# Patient Record
Sex: FEMALE | NOT HISPANIC OR LATINO | Employment: OTHER | URBAN - METROPOLITAN AREA
[De-identification: names, ages, dates, MRNs, and addresses within clinical notes are randomized per-mention and may not be internally consistent; named-entity substitution may affect disease eponyms.]

---

## 2017-01-04 ENCOUNTER — APPOINTMENT (OUTPATIENT)
Dept: ONCOLOGY | Facility: HOSPITAL | Age: 42
End: 2017-01-04

## 2017-01-04 ENCOUNTER — APPOINTMENT (OUTPATIENT)
Dept: LAB | Facility: HOSPITAL | Age: 42
End: 2017-01-04

## 2017-01-10 ENCOUNTER — CLINICAL SUPPORT (OUTPATIENT)
Dept: ONCOLOGY | Facility: HOSPITAL | Age: 42
End: 2017-01-10

## 2017-01-10 ENCOUNTER — LAB (OUTPATIENT)
Dept: LAB | Facility: HOSPITAL | Age: 42
End: 2017-01-10

## 2017-01-10 DIAGNOSIS — D69.3 IMMUNE THROMBOCYTOPENIA (HCC): ICD-10-CM

## 2017-01-10 DIAGNOSIS — D50.8 OTHER IRON DEFICIENCY ANEMIA: ICD-10-CM

## 2017-01-10 LAB
BASOPHILS # BLD AUTO: 0.02 10*3/MM3 (ref 0–0.1)
BASOPHILS NFR BLD AUTO: 0.3 % (ref 0–1.1)
DEPRECATED RDW RBC AUTO: 41.1 FL (ref 37–49)
EOSINOPHIL # BLD AUTO: 0.19 10*3/MM3 (ref 0–0.36)
EOSINOPHIL NFR BLD AUTO: 3.2 % (ref 1–5)
ERYTHROCYTE [DISTWIDTH] IN BLOOD BY AUTOMATED COUNT: 11.8 % (ref 11.7–14.5)
FERRITIN SERPL-MCNC: 132.9 NG/ML (ref 11–207)
HCT VFR BLD AUTO: 41.4 % (ref 34–45)
HGB BLD-MCNC: 13.6 G/DL (ref 11.5–14.9)
IMM GRANULOCYTES # BLD: 0.01 10*3/MM3 (ref 0–0.03)
IMM GRANULOCYTES NFR BLD: 0.2 % (ref 0–0.5)
IRON 24H UR-MRATE: 70 MCG/DL (ref 37–145)
IRON SATN MFR SERPL: 28 % (ref 14–48)
LYMPHOCYTES # BLD AUTO: 1.26 10*3/MM3 (ref 1–3.5)
LYMPHOCYTES NFR BLD AUTO: 21 % (ref 20–49)
MCH RBC QN AUTO: 31.6 PG (ref 27–33)
MCHC RBC AUTO-ENTMCNC: 32.9 G/DL (ref 32–35)
MCV RBC AUTO: 96.1 FL (ref 83–97)
MONOCYTES # BLD AUTO: 0.65 10*3/MM3 (ref 0.25–0.8)
MONOCYTES NFR BLD AUTO: 10.8 % (ref 4–12)
NEUTROPHILS # BLD AUTO: 3.88 10*3/MM3 (ref 1.5–7)
NEUTROPHILS NFR BLD AUTO: 64.5 % (ref 39–75)
NRBC BLD MANUAL-RTO: 0 /100 WBC (ref 0–0)
PLATELET # BLD AUTO: 161 10*3/MM3 (ref 150–375)
PMV BLD AUTO: 11.6 FL (ref 8.9–12.1)
RBC # BLD AUTO: 4.31 10*6/MM3 (ref 3.9–5)
TIBC SERPL-MCNC: 253 MCG/DL (ref 249–505)
TRANSFERRIN SERPL-MCNC: 181 MG/DL (ref 200–360)
WBC NRBC COR # BLD: 6.01 10*3/MM3 (ref 4–10)

## 2017-01-10 PROCEDURE — 85025 COMPLETE CBC W/AUTO DIFF WBC: CPT | Performed by: INTERNAL MEDICINE

## 2017-01-10 PROCEDURE — 82728 ASSAY OF FERRITIN: CPT | Performed by: INTERNAL MEDICINE

## 2017-01-10 PROCEDURE — 84466 ASSAY OF TRANSFERRIN: CPT | Performed by: INTERNAL MEDICINE

## 2017-01-10 PROCEDURE — 36415 COLL VENOUS BLD VENIPUNCTURE: CPT | Performed by: INTERNAL MEDICINE

## 2017-01-10 PROCEDURE — 83540 ASSAY OF IRON: CPT | Performed by: INTERNAL MEDICINE

## 2017-01-10 NOTE — PROGRESS NOTES
CBC reviewed with pt. Copy of labs given to pt. All counts WNL. Only c/o is that pt currently has a terrible head cold for which she is treating with OTC meds. Pt is due to see Dr. Alvarez in may, pt will call at a later time to make an appt. Pt will call tomorrow for iron results.

## 2017-07-24 ENCOUNTER — OFFICE VISIT (OUTPATIENT)
Dept: ONCOLOGY | Facility: CLINIC | Age: 42
End: 2017-07-24

## 2017-07-24 ENCOUNTER — LAB (OUTPATIENT)
Dept: LAB | Facility: HOSPITAL | Age: 42
End: 2017-07-24

## 2017-07-24 VITALS
HEART RATE: 71 BPM | BODY MASS INDEX: 20.84 KG/M2 | WEIGHT: 132.8 LBS | OXYGEN SATURATION: 100 % | RESPIRATION RATE: 14 BRPM | TEMPERATURE: 98.3 F | HEIGHT: 67 IN | DIASTOLIC BLOOD PRESSURE: 78 MMHG | SYSTOLIC BLOOD PRESSURE: 108 MMHG

## 2017-07-24 DIAGNOSIS — D50.8 OTHER IRON DEFICIENCY ANEMIA: Primary | ICD-10-CM

## 2017-07-24 DIAGNOSIS — D69.3 IMMUNE THROMBOCYTOPENIA (HCC): ICD-10-CM

## 2017-07-24 LAB
BASOPHILS # BLD AUTO: 0.03 10*3/MM3 (ref 0–0.1)
BASOPHILS NFR BLD AUTO: 0.4 % (ref 0–1.1)
DEPRECATED RDW RBC AUTO: 41 FL (ref 37–49)
EOSINOPHIL # BLD AUTO: 0.31 10*3/MM3 (ref 0–0.36)
EOSINOPHIL NFR BLD AUTO: 4.1 % (ref 1–5)
ERYTHROCYTE [DISTWIDTH] IN BLOOD BY AUTOMATED COUNT: 11.5 % (ref 11.7–14.5)
FERRITIN SERPL-MCNC: 118.7 NG/ML (ref 11–207)
HCT VFR BLD AUTO: 43.9 % (ref 34–45)
HGB BLD-MCNC: 14.6 G/DL (ref 11.5–14.9)
HOLD SPECIMEN: NORMAL
IMM GRANULOCYTES # BLD: 0.01 10*3/MM3 (ref 0–0.03)
IMM GRANULOCYTES NFR BLD: 0.1 % (ref 0–0.5)
IRON 24H UR-MRATE: 61 MCG/DL (ref 37–145)
IRON SATN MFR SERPL: 23 % (ref 14–48)
LYMPHOCYTES # BLD AUTO: 1.91 10*3/MM3 (ref 1–3.5)
LYMPHOCYTES NFR BLD AUTO: 25 % (ref 20–49)
MCH RBC QN AUTO: 32.2 PG (ref 27–33)
MCHC RBC AUTO-ENTMCNC: 33.3 G/DL (ref 32–35)
MCV RBC AUTO: 96.7 FL (ref 83–97)
MONOCYTES # BLD AUTO: 0.56 10*3/MM3 (ref 0.25–0.8)
MONOCYTES NFR BLD AUTO: 7.3 % (ref 4–12)
NEUTROPHILS # BLD AUTO: 4.83 10*3/MM3 (ref 1.5–7)
NEUTROPHILS NFR BLD AUTO: 63.1 % (ref 39–75)
NRBC BLD MANUAL-RTO: 0 /100 WBC (ref 0–0)
PLATELET # BLD AUTO: 162 10*3/MM3 (ref 150–375)
PMV BLD AUTO: 12 FL (ref 8.9–12.1)
RBC # BLD AUTO: 4.54 10*6/MM3 (ref 3.9–5)
TIBC SERPL-MCNC: 269 MCG/DL (ref 249–505)
TRANSFERRIN SERPL-MCNC: 192 MG/DL (ref 200–360)
WBC NRBC COR # BLD: 7.65 10*3/MM3 (ref 4–10)

## 2017-07-24 PROCEDURE — 82728 ASSAY OF FERRITIN: CPT

## 2017-07-24 PROCEDURE — 84466 ASSAY OF TRANSFERRIN: CPT

## 2017-07-24 PROCEDURE — 83540 ASSAY OF IRON: CPT

## 2017-07-24 PROCEDURE — 85025 COMPLETE CBC W/AUTO DIFF WBC: CPT

## 2017-07-24 PROCEDURE — 36415 COLL VENOUS BLD VENIPUNCTURE: CPT

## 2017-07-24 PROCEDURE — 99213 OFFICE O/P EST LOW 20 MIN: CPT | Performed by: INTERNAL MEDICINE

## 2017-07-24 RX ORDER — DIPHENHYDRAMINE HCL 25 MG
25 TABLET ORAL EVERY 6 HOURS PRN
COMMUNITY
End: 2017-09-20

## 2017-07-24 NOTE — PROGRESS NOTES
Subjective     CHIEF COMPLAINT:    1. Iron deficiency anemia.   2. History of Thrombocytopenia.     HISTORY OF PRESENT ILLNESS:     Terri Chaudhary is a 41 y.o.   patient with the above medical history.  She was previously on Otezla  for her psoriasis but she was taken off of the medicine 3 months ago  due to side effects (abdominal pain and dizziness).       The patient reports episodes of feeling dizzy especially with walking.  She was told that this may present vertigo.  She has occasional frontal headaches.  No nausea or vomiting.        Past Medical History:   Diagnosis Date   • Endometriosis    • H/O food allergy     allergic to all fruits and vegetables   • History of heavy periods    • History of seasonal allergies    • History of transfusion of packed red blood cells 2012   • Iron deficiency anemia    • Psoriasis    • Thrombocytopenia    • Traumatic rupture of bladder     during hysterectomy       Past Surgical History:   Procedure Laterality Date   •  SECTION      x4   • HYSTERECTOMY  2014   • PELVIC LAPAROSCOPY         Cancer-related family history is negative for Cancer.    SCHEDULED MEDS:       Current Outpatient Prescriptions:   •  Cetirizine HCl 10 MG capsule, Take  by mouth Daily., Disp: , Rfl:   •  diphenhydrAMINE (BENADRYL) 25 MG tablet, Take 25 mg by mouth Every 6 (Six) Hours As Needed., Disp: , Rfl:     REVIEW OF SYSTEMS:  GENERAL:  No fever or chills. No weight change.  Some fatigue.    SKIN:  Psoriasis.  HEME/LYMPH: No anemia, easy bruisability or enlarged lymph nodes.  EYES:  No vision changes or diplopia.  ENT: Sinus headaches.  She has fluid in her ears.  RESPIRATORY:  No cough, shortness of breath, hemoptysis or wheezing.  CVS:  No chest pain, palpitations or lower extremity swelling.  GI:  No abdominal pain, nausea, vomiting, constipation, diarrhea, melena or hematochezia.  MUSCULOSKELETAL:  Some arthritis in the small joints of the hands and stiffness in the  "morning.    Objective   VITAL SIGNS:     Vitals:    07/24/17 1406   BP: 108/78   Pulse: 71   Resp: 14   Temp: 98.3 °F (36.8 °C)   TempSrc: Oral   SpO2: 100%   Weight: 132 lb 12.8 oz (60.2 kg)   Height: 66.54\" (169 cm)  Comment: new ht   PainSc: 0-No pain       Wt Readings from Last 3 Encounters:   07/24/17 132 lb 12.8 oz (60.2 kg)   05/25/16 138 lb 6.4 oz (62.8 kg)       PHYSICAL EXAMINATION:  GENERAL:  The patient appears in good general condition, not in acute distress.  SKIN:  No ecchymosis or petechiae.  EYES: No jaundice or pallor    .  RESULT REVIEW:     Results from last 7 days  Lab Units 07/24/17  1359   WBC 10*3/mm3 7.65   HEMOGLOBIN g/dL 14.6   HEMATOCRIT % 43.9   PLATELETS 10*3/mm3 162       Results from last 7 days  Lab Units 07/24/17  1359   FERRITIN ng/mL 118.70   IRON mcg/dL 61   TIBC mcg/dL 269       Assessment/Plan    1.  Recurrent iron deficiency anemia. She has history of heavy blood losses from her periods but she had a hysterectomy in July 2014.  She had GI workup that revealed no evidence of a source of blood loss.  The patient is most likely not absorbing iron that she obtains from vegetables.  She is avoiding red meats due to the potential association with worsening of the psoriasis.  She was symptomatic when she was seen in December 2015 and she was treated IV Feraheme x2 doses.  Her hemoglobin iron stores remain normal.  She is having symptoms of fatigue and episodes of dizziness/vertigo.  I reassured the patient that her symptoms are not related to her iron deficiency anemia since her hemoglobin is normal and her iron Stores are adequate.    2.   History of thrombocytopenia, likely autoimmune. Platelet count is normal today.    Since her hemoglobin and iron stores are adequate, I recommended a repeat CBC, ferritin and iron panel in 6 months with RN review.  We will see in follow-up in one year with CBC, ferritin and iron panel.  She will notify us sooner if she has recurrence of the " symptoms she had from the iron deficiency anemia.      Bea Alvarez MD  07/24/17

## 2017-09-20 ENCOUNTER — OFFICE VISIT (OUTPATIENT)
Dept: OBSTETRICS AND GYNECOLOGY | Facility: CLINIC | Age: 42
End: 2017-09-20

## 2017-09-20 VITALS
DIASTOLIC BLOOD PRESSURE: 91 MMHG | WEIGHT: 136 LBS | SYSTOLIC BLOOD PRESSURE: 140 MMHG | HEIGHT: 66 IN | HEART RATE: 96 BPM | BODY MASS INDEX: 21.86 KG/M2

## 2017-09-20 DIAGNOSIS — Z01.419 ENCOUNTER FOR GYNECOLOGICAL EXAMINATION WITHOUT ABNORMAL FINDING: Primary | ICD-10-CM

## 2017-09-20 PROCEDURE — 99395 PREV VISIT EST AGE 18-39: CPT | Performed by: OBSTETRICS & GYNECOLOGY

## 2017-09-20 NOTE — PROGRESS NOTES
GYN Annual Exam     CC- Here for annual exam.     Terri Chaudhary is a 41 y.o. female who presents for annual well woman exam. Periods are absent due to Hysterectomy. Pt c/o menstrual cramps monthly when she should be having a period. Pt feels swollen during this time.     OB History      Para Term  AB Living    4 4    4    SAB TAB Ectopic Multiple Live Births                  Current contraception: status post hysterectomy  History of abnormal Pap smear: Yes, no treatment required  Family history of uterine, colon or ovarian cancer: no  History of abnormal mammogram: no  Family history of breast cancer: no  Last Pap : 2015 NL HPV-    Past Medical History:   Diagnosis Date   • Endometriosis    • H/O food allergy     allergic to all fruits and vegetables   • History of heavy periods    • History of seasonal allergies    • History of transfusion of packed red blood cells 2012   • Iron deficiency anemia    • Psoriasis    • Thrombocytopenia    • Traumatic rupture of bladder     during hysterectomy       Past Surgical History:   Procedure Laterality Date   •  SECTION      x4   • HYSTERECTOMY  2014   • PELVIC LAPAROSCOPY           Current Outpatient Prescriptions:   •  Apremilast (OTEZLA) 30 MG tablet, Take  by mouth., Disp: , Rfl:     No Known Allergies    Social History   Substance Use Topics   • Smoking status: Never Smoker   • Smokeless tobacco: Never Used   • Alcohol use No       Family History   Problem Relation Age of Onset   • Aneurysm Mother    • Diabetes Father      type 2   • Deep vein thrombosis Father    • No Known Problems Sister    • No Known Problems Brother    • No Known Problems Daughter    • No Known Problems Son    • No Known Problems Maternal Aunt    • No Known Problems Maternal Uncle    • No Known Problems Paternal Aunt    • No Known Problems Paternal Uncle    • No Known Problems Maternal Grandmother    • No Known Problems Maternal Grandfather    • No Known Problems  "Paternal Grandmother    • No Known Problems Paternal Grandfather    • Cancer Neg Hx    • Breast cancer Neg Hx    • Ovarian cancer Neg Hx    • Uterine cancer Neg Hx    • Colon cancer Neg Hx    • Pulmonary embolism Neg Hx        Review of Systems   Constitutional: Negative for chills and fever.   Gastrointestinal: Negative for abdominal pain.   Genitourinary: Negative for dysuria, pelvic pain, vaginal bleeding and vaginal discharge.   All other systems reviewed and are negative.      /91  Pulse 96  Ht 66\" (167.6 cm)  Wt 136 lb (61.7 kg)  Breastfeeding? No  BMI 21.95 kg/m2    Physical Exam   Constitutional: She is oriented to person, place, and time. She appears well-developed and well-nourished. No distress.   HENT:   Head: Normocephalic and atraumatic.   Eyes: Conjunctivae are normal.   Neck: Normal range of motion. Neck supple. No thyromegaly present.   Cardiovascular: Normal rate and regular rhythm.    No murmur heard.  Pulmonary/Chest: Effort normal and breath sounds normal. Right breast exhibits no inverted nipple, no mass and no nipple discharge. Left breast exhibits no inverted nipple, no mass and no nipple discharge.   Abdominal: Soft. Bowel sounds are normal. She exhibits no distension. There is no tenderness.   Genitourinary: Vagina normal and uterus normal. Pelvic exam was performed with patient supine. There is no lesion on the right labia. There is no lesion on the left labia. Right adnexum displays no mass and no tenderness. Left adnexum displays no mass and no tenderness. No bleeding (cervix and uterus absent ) in the vagina. No vaginal discharge found.   Musculoskeletal: She exhibits no edema.   Lymphadenopathy:        Right: No inguinal adenopathy present.        Left: No inguinal adenopathy present.   Neurological: She is alert and oriented to person, place, and time.   Skin: No rash noted.   Psychiatric: She has a normal mood and affect. Her behavior is normal.          Assessment     1) " GYN annual well woman exam.        Plan     1) Breast Health - Clinical breast exam & mammogram yearly, Self breast awareness monthly  2) Pap - up to date  3) Smoking status- non-smoker   4) Seat belts & Sunscreen recommended  5) Follow up prn and one year.     Maurice Florez MD   9/20/2017  1:38 PM

## 2017-10-16 ENCOUNTER — TELEPHONE (OUTPATIENT)
Dept: GASTROENTEROLOGY | Facility: CLINIC | Age: 42
End: 2017-10-16

## 2017-10-16 NOTE — TELEPHONE ENCOUNTER
Call to [facility] @ [phone] and spoke with Candy.  Request ER records from 9/26 and 10/4 be faxed to [phone].

## 2017-10-16 NOTE — TELEPHONE ENCOUNTER
----- Message from Taniya Esparza sent at 10/12/2017  9:20 AM EDT -----  Regarding: RECORDS  PT HAS O/V 10/19 WITH DR MCLAIN. SHE WAS SEEN @ [facility] ER 09/26 & 10/04. HAD TEST DONE. PLEASE REQUEST. THANKS

## 2017-10-19 ENCOUNTER — OFFICE VISIT (OUTPATIENT)
Dept: GASTROENTEROLOGY | Facility: CLINIC | Age: 42
End: 2017-10-19

## 2017-10-19 VITALS
HEIGHT: 66 IN | WEIGHT: 138.6 LBS | SYSTOLIC BLOOD PRESSURE: 118 MMHG | DIASTOLIC BLOOD PRESSURE: 76 MMHG | BODY MASS INDEX: 22.28 KG/M2 | TEMPERATURE: 98.6 F

## 2017-10-19 DIAGNOSIS — R10.13 EPIGASTRIC PAIN: Primary | ICD-10-CM

## 2017-10-19 PROCEDURE — 99213 OFFICE O/P EST LOW 20 MIN: CPT | Performed by: INTERNAL MEDICINE

## 2017-10-19 RX ORDER — OMEPRAZOLE 40 MG/1
40 CAPSULE, DELAYED RELEASE ORAL DAILY
Qty: 30 CAPSULE | Refills: 11 | Status: SHIPPED | OUTPATIENT
Start: 2017-10-19 | End: 2017-11-08

## 2017-10-19 RX ORDER — DIPHENHYDRAMINE HCL 25 MG
25 CAPSULE ORAL EVERY 6 HOURS PRN
COMMUNITY
End: 2017-11-08

## 2017-10-19 NOTE — PROGRESS NOTES
Chief Complaint   Patient presents with   • Abdominal Pain   • Nausea       History of Present Illness: 41 yo female who in  developed RUQ abdom pain and went to the [facility] ER. Labs and CT abd were done. She then developed epigastric pain (with radiation to the mid back) and went back to ER. It is worse when she movees certain ways and when she eats. Nothing makes the epigastric pain better.  She was found to have an enlarged liver and was taken off of most of her meds. She saw General Surgeon (Dr. Atkinson) who found an umbilical hernia. She saw an Immunologist (Dr. Carmona) and he destin labs. She is allergic to lots of foods: all fruits and vegies (she will get hives in moutth and throat swells, chest pain). She takes benadryl daily. She can eat cooked veggies. 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 She takes Aleve prn. Some nausea, no vomiting. No heartburn. No dysphagia or odynaphagia. She is usually constipated. Yesterday she had diarrhea. No appetite. She has gained weight. Out of work. NO rectal bleeding or melena. 4 kids, . Nonsmoker, no ETOH. Little caffeine. LMP - s/p hysterectomy. .  We reviewed the labs and the US of the gallbladder and CT abd/pelvis done in the last 2 mos. We reviewed her  EGD and colonoscopy.    Past Medical History:   Diagnosis Date   • Endometriosis    • H/O food allergy     allergic to all fruits and vegetables   • History of heavy periods    • History of seasonal allergies    • History of transfusion of packed red blood cells 2012   • Iron deficiency anemia    • Psoriasis    • Thrombocytopenia    • Traumatic rupture of bladder     during hysterectomy       Past Surgical History:   Procedure Laterality Date   •  SECTION      x4   • COLONOSCOPY  2016    NBET.  no bx    • ENDOSCOPY  2016    Erythematous mucosa in the stomach.  PATH: Gastric mucosa with patchy minimal chronic gastritis   • HYSTERECTOMY  2014   • PELVIC  LAPAROSCOPY           Current Outpatient Prescriptions:   •  Apremilast (OTEZLA) 30 MG tablet, Take  by mouth., Disp: , Rfl:   •  diphenhydrAMINE (BENADRYL) 25 mg capsule, Take 25 mg by mouth Every 6 (Six) Hours As Needed for Itching., Disp: , Rfl:   •  omeprazole (priLOSEC) 40 MG capsule, Take 1 capsule by mouth Daily., Disp: 30 capsule, Rfl: 11    No Known Allergies    Family History   Problem Relation Age of Onset   • Aneurysm Mother    • Diabetes Father      type 2   • Deep vein thrombosis Father    • No Known Problems Sister    • No Known Problems Brother    • No Known Problems Daughter    • No Known Problems Son    • No Known Problems Maternal Aunt    • No Known Problems Maternal Uncle    • No Known Problems Paternal Aunt    • No Known Problems Paternal Uncle    • No Known Problems Maternal Grandmother    • No Known Problems Maternal Grandfather    • No Known Problems Paternal Grandmother    • No Known Problems Paternal Grandfather    • Cancer Neg Hx    • Breast cancer Neg Hx    • Ovarian cancer Neg Hx    • Uterine cancer Neg Hx    • Colon cancer Neg Hx    • Pulmonary embolism Neg Hx        Social History     Social History   • Marital status:      Spouse name: Elio   • Number of children: N/A   • Years of education: N/A     Occupational History   •  Self-Employed     Social History Main Topics   • Smoking status: Never Smoker   • Smokeless tobacco: Never Used   • Alcohol use No   • Drug use: No   • Sexual activity: Yes     Partners: Male     Birth control/ protection: Surgical     Other Topics Concern   • Not on file     Social History Narrative       Review of Systems   Gastrointestinal: Positive for abdominal distention and abdominal pain.   All other systems reviewed and are negative.      Vitals:    10/19/17 0901   BP: 118/76   Temp: 98.6 °F (37 °C)       Physical Exam   Constitutional: She is oriented to person, place, and time. She appears well-developed and well-nourished. No distress.    HENT:   Head: Normocephalic and atraumatic. Hair is normal.   Right Ear: Hearing, tympanic membrane, external ear and ear canal normal. No drainage. No decreased hearing is noted.   Left Ear: Hearing, tympanic membrane, external ear and ear canal normal. No decreased hearing is noted.   Nose: No nasal deformity.   Mouth/Throat: Oropharynx is clear and moist.   Eyes: Conjunctivae, EOM and lids are normal. Pupils are equal, round, and reactive to light. Right eye exhibits no discharge. Left eye exhibits no discharge.   Neck: Normal range of motion. Neck supple. No JVD present. No tracheal deviation present. No thyromegaly present.   Cardiovascular: Normal rate, regular rhythm, normal heart sounds, intact distal pulses and normal pulses.  Exam reveals no gallop and no friction rub.    No murmur heard.  Pulmonary/Chest: Effort normal and breath sounds normal. No respiratory distress. She has no wheezes. She has no rales. She exhibits no tenderness.   Abdominal: Soft. Bowel sounds are normal. She exhibits no distension and no mass. There is tenderness. There is no rebound and no guarding. No hernia.   Miild epigastric tenderness.   Genitourinary: Rectal exam shows guaiac negative stool.   Genitourinary Comments: Normal anorectal exam.   Musculoskeletal: Normal range of motion. She exhibits no edema, tenderness or deformity.   Lymphadenopathy:     She has no cervical adenopathy.   Neurological: She is alert and oriented to person, place, and time. She has normal reflexes. She displays normal reflexes. No cranial nerve deficit. She exhibits normal muscle tone. Coordination normal.   Skin: Skin is warm and dry. No rash noted. She is not diaphoretic. No erythema.   Psychiatric: She has a normal mood and affect. Her behavior is normal. Judgment and thought content normal.   Vitals reviewed.      Terri was seen today for abdominal pain and nausea.    Diagnoses and all orders for this visit:    Epigastric pain  -     Case  Request; Standing  -     Case Request  -     omeprazole (priLOSEC) 40 MG capsule; Take 1 capsule by mouth Daily.    Other orders  -     Implement Anesthesia orders day of procedure.; Standing  -     Obtain informed consent; Standing       Assessment:  1) Epigastric pain  2) Constipation    Recommendations:  1) EGD in the next two weeks. If this is negative then consider a Kinevac Hida.  2) Omeprazole 40 mg/day.      No Follow-up on file.    Mohit Sawyer MD  10/19/2017

## 2017-10-26 ENCOUNTER — TELEPHONE (OUTPATIENT)
Dept: ONCOLOGY | Facility: HOSPITAL | Age: 42
End: 2017-10-26

## 2017-10-26 NOTE — TELEPHONE ENCOUNTER
Notified patient and message sent to scheduling. Pt v/u    ----- Message from Bea Alvarez MD sent at 10/26/2017  1:33 PM EDT -----  Ok to schedule the patient for an MD visit in 1-2 weeks + CBC STAT Ferritin iron panel  Thank you    ----- Message -----     From: Kaley Verde RN     Sent: 10/26/2017  12:40 PM       To: Bea Alvarez MD    Patient calling to see if she can schedule MD appt. Pt states she had labs drawn with another MD and he said they were ok but she wants to review them with us. Pt has had some issues with abdominal/back pain. They have done a lot of scans which did not show anything except slightly enlarged liver. She has an Upper GI scheduled for Nov 6th. At this point they thought the pain was shingles without the rash and have not determined anything else. I believe patient's labs and test are in care everywhere if you would like to review them. Let me know if you think patient needs to be seen soon and if so any labs to be ordered? Thanks

## 2017-10-26 NOTE — TELEPHONE ENCOUNTER
Patient calling to see if she can change upcoming lab appt to an MD appt instead. Pt states she had labs drawn with another MD and he said they were ok but she wants to review them with us. Pt has had some issues with abdominal/back pain. They have done a lot of scans which did not show anything except slightly enlarged liver. She has an Upper GI scheduled for Nov 6th. At this point they thought the pain was shingles without the rash and have not determined anything else.   Requested patient to have her labs sent to our office. I believe they are already in care everywhere along with scans.   Inbasket sent to Dr Alvarez to see if patient needs f/u with us sooner. Will call patient back once we hear from him.

## 2017-11-06 ENCOUNTER — HOSPITAL ENCOUNTER (OUTPATIENT)
Facility: HOSPITAL | Age: 42
Setting detail: HOSPITAL OUTPATIENT SURGERY
Discharge: HOME | End: 2017-11-06
Attending: INTERNAL MEDICINE | Admitting: INTERNAL MEDICINE

## 2017-11-06 ENCOUNTER — ANESTHESIA (OUTPATIENT)
Dept: GASTROENTEROLOGY | Facility: HOSPITAL | Age: 42
End: 2017-11-06

## 2017-11-06 ENCOUNTER — ANESTHESIA EVENT (OUTPATIENT)
Dept: GASTROENTEROLOGY | Facility: HOSPITAL | Age: 42
End: 2017-11-06

## 2017-11-06 VITALS
HEIGHT: 66 IN | RESPIRATION RATE: 18 BRPM | WEIGHT: 140.2 LBS | SYSTOLIC BLOOD PRESSURE: 117 MMHG | TEMPERATURE: 97.7 F | BODY MASS INDEX: 22.53 KG/M2 | HEART RATE: 67 BPM | OXYGEN SATURATION: 100 % | DIASTOLIC BLOOD PRESSURE: 81 MMHG

## 2017-11-06 DIAGNOSIS — R10.10 PAIN OF UPPER ABDOMEN: Primary | ICD-10-CM

## 2017-11-06 DIAGNOSIS — R10.13 EPIGASTRIC PAIN: ICD-10-CM

## 2017-11-06 PROCEDURE — 87081 CULTURE SCREEN ONLY: CPT | Performed by: INTERNAL MEDICINE

## 2017-11-06 PROCEDURE — 88312 SPECIAL STAINS GROUP 1: CPT | Performed by: INTERNAL MEDICINE

## 2017-11-06 PROCEDURE — 43239 EGD BIOPSY SINGLE/MULTIPLE: CPT | Performed by: INTERNAL MEDICINE

## 2017-11-06 PROCEDURE — 88305 TISSUE EXAM BY PATHOLOGIST: CPT | Performed by: INTERNAL MEDICINE

## 2017-11-06 PROCEDURE — 25010000002 PROPOFOL 10 MG/ML EMULSION: Performed by: ANESTHESIOLOGY

## 2017-11-06 RX ORDER — SODIUM CHLORIDE 0.9 % (FLUSH) 0.9 %
1-10 SYRINGE (ML) INJECTION AS NEEDED
Status: DISCONTINUED | OUTPATIENT
Start: 2017-11-06 | End: 2017-11-06 | Stop reason: HOSPADM

## 2017-11-06 RX ORDER — FEXOFENADINE HCL 180 MG/1
180 TABLET ORAL DAILY
Status: ON HOLD | COMMUNITY
End: 2017-11-27

## 2017-11-06 RX ORDER — PROPOFOL 10 MG/ML
VIAL (ML) INTRAVENOUS AS NEEDED
Status: DISCONTINUED | OUTPATIENT
Start: 2017-11-06 | End: 2017-11-06 | Stop reason: SURG

## 2017-11-06 RX ORDER — SODIUM CHLORIDE, SODIUM LACTATE, POTASSIUM CHLORIDE, CALCIUM CHLORIDE 600; 310; 30; 20 MG/100ML; MG/100ML; MG/100ML; MG/100ML
30 INJECTION, SOLUTION INTRAVENOUS CONTINUOUS PRN
Status: DISCONTINUED | OUTPATIENT
Start: 2017-11-06 | End: 2017-11-06 | Stop reason: HOSPADM

## 2017-11-06 RX ORDER — ONDANSETRON 4 MG/1
4 TABLET, FILM COATED ORAL EVERY 8 HOURS PRN
COMMUNITY

## 2017-11-06 RX ORDER — LIDOCAINE HYDROCHLORIDE 20 MG/ML
INJECTION, SOLUTION INFILTRATION; PERINEURAL AS NEEDED
Status: DISCONTINUED | OUTPATIENT
Start: 2017-11-06 | End: 2017-11-06 | Stop reason: SURG

## 2017-11-06 RX ADMIN — PROPOFOL 200 MG: 10 INJECTION, EMULSION INTRAVENOUS at 10:52

## 2017-11-06 RX ADMIN — PROPOFOL 200 MG: 10 INJECTION, EMULSION INTRAVENOUS at 10:42

## 2017-11-06 RX ADMIN — SODIUM CHLORIDE, POTASSIUM CHLORIDE, SODIUM LACTATE AND CALCIUM CHLORIDE 30 ML/HR: 600; 310; 30; 20 INJECTION, SOLUTION INTRAVENOUS at 09:59

## 2017-11-06 RX ADMIN — LIDOCAINE HYDROCHLORIDE 100 MG: 20 INJECTION, SOLUTION INFILTRATION; PERINEURAL at 10:44

## 2017-11-06 NOTE — H&P
Chief Complaint   Patient presents with   • Abdominal Pain   • Nausea         History of Present Illness: 43 yo female who in  developed RUQ abdom pain and went to the [facility] ER. Labs and CT abd were done. She then developed epigastric pain (with radiation to the mid back) and went back to ER. It is worse when she movees certain ways and when she eats. Nothing makes the epigastric pain better.  She was found to have an enlarged liver and was taken off of most of her meds. She saw General Surgeon (Dr. Atkinson) who found an umbilical hernia. She saw an Immunologist (Dr. Carmona) and he destin labs. She is allergic to lots of foods: all fruits and vegies (she will get hives in moutth and throat swells, chest pain). She takes benadryl daily. She can eat cooked veggies. 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 She takes Aleve prn. Some nausea, no vomiting. No heartburn. No dysphagia or odynaphagia. She is usually constipated. Yesterday she had diarrhea. No appetite. She has gained weight. Out of work. NO rectal bleeding or melena. 4 kids, . Nonsmoker, no ETOH. Little caffeine. LMP - s/p hysterectomy. .  We reviewed the labs and the US of the gallbladder and CT abd/pelvis done in the last 2 mos. We reviewed her  EGD and colonoscopy.      Medical History         Past Medical History:   Diagnosis Date   • Endometriosis     • H/O food allergy       allergic to all fruits and vegetables   • History of heavy periods     • History of seasonal allergies     • History of transfusion of packed red blood cells 2012   • Iron deficiency anemia     • Psoriasis     • Thrombocytopenia     • Traumatic rupture of bladder       during hysterectomy             Surgical History          Past Surgical History:   Procedure Laterality Date   •  SECTION         x4   • COLONOSCOPY   2016     NBET.  no bx    • ENDOSCOPY   2016     Erythematous mucosa in the stomach.  PATH: Gastric mucosa with  patchy minimal chronic gastritis   • HYSTERECTOMY   07/2014   • PELVIC LAPAROSCOPY                   Current Outpatient Prescriptions:   •  Apremilast (OTEZLA) 30 MG tablet, Take  by mouth., Disp: , Rfl:   •  diphenhydrAMINE (BENADRYL) 25 mg capsule, Take 25 mg by mouth Every 6 (Six) Hours As Needed for Itching., Disp: , Rfl:   •  omeprazole (priLOSEC) 40 MG capsule, Take 1 capsule by mouth Daily., Disp: 30 capsule, Rfl: 11     No Known Allergies            Family History   Problem Relation Age of Onset   • Aneurysm Mother     • Diabetes Father         type 2   • Deep vein thrombosis Father     • No Known Problems Sister     • No Known Problems Brother     • No Known Problems Daughter     • No Known Problems Son     • No Known Problems Maternal Aunt     • No Known Problems Maternal Uncle     • No Known Problems Paternal Aunt     • No Known Problems Paternal Uncle     • No Known Problems Maternal Grandmother     • No Known Problems Maternal Grandfather     • No Known Problems Paternal Grandmother     • No Known Problems Paternal Grandfather     • Cancer Neg Hx     • Breast cancer Neg Hx     • Ovarian cancer Neg Hx     • Uterine cancer Neg Hx     • Colon cancer Neg Hx     • Pulmonary embolism Neg Hx            Social History    Social History            Social History   • Marital status:        Spouse name: Elio   • Number of children: N/A   • Years of education: N/A           Occupational History   •   Self-Employed      Social History Main Topics   • Smoking status: Never Smoker   • Smokeless tobacco: Never Used   • Alcohol use No   • Drug use: No   • Sexual activity: Yes       Partners: Male       Birth control/ protection: Surgical           Other Topics Concern   • Not on file      Social History Narrative            Review of Systems   Gastrointestinal: Positive for abdominal distention and abdominal pain.   All other systems reviewed and are negative.            Vitals:     10/19/17 0901   BP: 118/76    Temp: 98.6 °F (37 °C)         Physical Exam   Constitutional: She is oriented to person, place, and time. She appears well-developed and well-nourished. No distress.   HENT:   Head: Normocephalic and atraumatic. Hair is normal.   Right Ear: Hearing, tympanic membrane, external ear and ear canal normal. No drainage. No decreased hearing is noted.   Left Ear: Hearing, tympanic membrane, external ear and ear canal normal. No decreased hearing is noted.   Nose: No nasal deformity.   Mouth/Throat: Oropharynx is clear and moist.   Eyes: Conjunctivae, EOM and lids are normal. Pupils are equal, round, and reactive to light. Right eye exhibits no discharge. Left eye exhibits no discharge.   Neck: Normal range of motion. Neck supple. No JVD present. No tracheal deviation present. No thyromegaly present.   Cardiovascular: Normal rate, regular rhythm, normal heart sounds, intact distal pulses and normal pulses.  Exam reveals no gallop and no friction rub.    No murmur heard.  Pulmonary/Chest: Effort normal and breath sounds normal. No respiratory distress. She has no wheezes. She has no rales. She exhibits no tenderness.   Abdominal: Soft. Bowel sounds are normal. She exhibits no distension and no mass. There is tenderness. There is no rebound and no guarding. No hernia.   Miild epigastric tenderness.   Genitourinary: Rectal exam shows guaiac negative stool.   Genitourinary Comments: Normal anorectal exam.   Musculoskeletal: Normal range of motion. She exhibits no edema, tenderness or deformity.   Lymphadenopathy:     She has no cervical adenopathy.   Neurological: She is alert and oriented to person, place, and time. She has normal reflexes. She displays normal reflexes. No cranial nerve deficit. She exhibits normal muscle tone. Coordination normal.   Skin: Skin is warm and dry. No rash noted. She is not diaphoretic. No erythema.   Psychiatric: She has a normal mood and affect. Her behavior is normal. Judgment and thought  content normal.   Vitals reviewed.        Terri was seen today for abdominal pain and nausea.     Diagnoses and all orders for this visit:     Epigastric pain  -     Case Request; Standing  -     Case Request  -     omeprazole (priLOSEC) 40 MG capsule; Take 1 capsule by mouth Daily.     Other orders  -     Implement Anesthesia orders day of procedure.; Standing  -     Obtain informed consent; Standing        Assessment:  1) Epigastric pain  2) Constipation     Recommendations:  1) EGD in the next two weeks. If this is negative then consider a Kinevac Hida.  2) Omeprazole 40 mg/day. She does not feel that the Omeprazole has helped.    11/6/17 - No change from the above H and P. Mohit De Jesus M.D.

## 2017-11-06 NOTE — ANESTHESIA POSTPROCEDURE EVALUATION
"Patient: Terri Chaudhary    Procedure Summary     Date Anesthesia Start Anesthesia Stop Room / Location    11/06/17 1042 1100  [facility]       Procedure Diagnosis Surgeon Provider    ESOPHAGOGASTRODUODENOSCOPY WITH BIOPSIES (N/A Esophagus) Epigastric pain  (Epigastric pain [R10.13]) MD Lilian Paulson MD          Anesthesia Type: MAC  Last vitals  BP   117/81 (11/06/17 1119)   Temp   36.5 °C (97.7 °F) (11/06/17 1107)   Pulse   67 (11/06/17 1119)   Resp   18 (11/06/17 1119)     SpO2   100 % (11/06/17 1119)     Post Anesthesia Care and Evaluation    Patient location during evaluation: PACU  Patient participation: complete - patient participated  Level of consciousness: awake and alert  Pain management: adequate  Airway patency: patent  Anesthetic complications: No anesthetic complications    Cardiovascular status: acceptable  Respiratory status: acceptable  Hydration status: acceptable    Comments: /81 (BP Location: Left arm, Patient Position: Lying)  Pulse 67  Temp 36.5 °C (97.7 °F) (Oral)   Resp 18  Ht 66\" (167.6 cm)  Wt 140 lb 3.2 oz (63.6 kg)  SpO2 100%  BMI 22.63 kg/m2      "

## 2017-11-06 NOTE — PLAN OF CARE
Problem: Patient Care Overview (Adult)  Goal: Plan of Care Review  Outcome: Ongoing (interventions implemented as appropriate)    11/06/17 0931   Coping/Psychosocial Response Interventions   Plan Of Care Reviewed With patient       Goal: Adult Individualization and Mutuality  Outcome: Ongoing (interventions implemented as appropriate)  Goal: Discharge Needs Assessment  Outcome: Ongoing (interventions implemented as appropriate)    11/06/17 0931   Discharge Needs Assessment   Concerns To Be Addressed no discharge needs identified   Discharge Disposition home or self-care   Living Environment   Transportation Available car;family or friend will provide         Problem: GI Endoscopy (Adult)  Goal: Signs and Symptoms of Listed Potential Problems Will be Absent or Manageable (GI Endoscopy)  Outcome: Ongoing (interventions implemented as appropriate)    11/06/17 0931   GI Endoscopy   Problems Assessed (GI Endoscopy) all   Problems Present (GI Endoscopy) none

## 2017-11-07 LAB
CYTO UR: NORMAL
LAB AP CASE REPORT: NORMAL
Lab: NORMAL
PATH REPORT.FINAL DX SPEC: NORMAL
PATH REPORT.GROSS SPEC: NORMAL
UREASE TISS QL: NEGATIVE

## 2017-11-08 ENCOUNTER — OFFICE VISIT (OUTPATIENT)
Dept: ONCOLOGY | Facility: CLINIC | Age: 42
End: 2017-11-08

## 2017-11-08 ENCOUNTER — LAB (OUTPATIENT)
Dept: LAB | Facility: HOSPITAL | Age: 42
End: 2017-11-08

## 2017-11-08 VITALS
WEIGHT: 142.8 LBS | OXYGEN SATURATION: 100 % | HEIGHT: 66 IN | TEMPERATURE: 98.6 F | SYSTOLIC BLOOD PRESSURE: 122 MMHG | DIASTOLIC BLOOD PRESSURE: 84 MMHG | RESPIRATION RATE: 16 BRPM | HEART RATE: 74 BPM | BODY MASS INDEX: 22.95 KG/M2

## 2017-11-08 DIAGNOSIS — D69.3 IMMUNE THROMBOCYTOPENIA (HCC): ICD-10-CM

## 2017-11-08 DIAGNOSIS — R10.13 EPIGASTRIC PAIN: ICD-10-CM

## 2017-11-08 DIAGNOSIS — D50.8 OTHER IRON DEFICIENCY ANEMIA: Primary | ICD-10-CM

## 2017-11-08 DIAGNOSIS — E53.8 B12 DEFICIENCY: ICD-10-CM

## 2017-11-08 DIAGNOSIS — D50.8 OTHER IRON DEFICIENCY ANEMIA: ICD-10-CM

## 2017-11-08 DIAGNOSIS — E83.52 HYPERCALCEMIA: ICD-10-CM

## 2017-11-08 LAB
ALBUMIN SERPL-MCNC: 4.1 G/DL (ref 3.5–5.2)
ALBUMIN/GLOB SERPL: 1.5 G/DL (ref 1.1–2.4)
ALP SERPL-CCNC: 45 U/L (ref 38–116)
ALT SERPL W P-5'-P-CCNC: 9 U/L (ref 0–33)
ANION GAP SERPL CALCULATED.3IONS-SCNC: 13.3 MMOL/L
AST SERPL-CCNC: 14 U/L (ref 0–32)
BASOPHILS # BLD AUTO: 0.02 10*3/MM3 (ref 0–0.1)
BASOPHILS NFR BLD AUTO: 0.3 % (ref 0–1.1)
BILIRUB SERPL-MCNC: 0.3 MG/DL (ref 0.1–1.2)
BUN BLD-MCNC: 13 MG/DL (ref 6–20)
BUN/CREAT SERPL: 18.8 (ref 7.3–30)
CALCIUM SPEC-SCNC: 9.1 MG/DL (ref 8.5–10.2)
CHLORIDE SERPL-SCNC: 101 MMOL/L (ref 98–107)
CO2 SERPL-SCNC: 26.7 MMOL/L (ref 22–29)
CREAT BLD-MCNC: 0.69 MG/DL (ref 0.6–1.1)
DEPRECATED RDW RBC AUTO: 43.8 FL (ref 37–49)
EOSINOPHIL # BLD AUTO: 0.27 10*3/MM3 (ref 0–0.36)
EOSINOPHIL NFR BLD AUTO: 4.6 % (ref 1–5)
ERYTHROCYTE [DISTWIDTH] IN BLOOD BY AUTOMATED COUNT: 12.1 % (ref 11.7–14.5)
FERRITIN SERPL-MCNC: 118.9 NG/ML (ref 11–207)
GFR SERPL CREATININE-BSD FRML MDRD: 94 ML/MIN/1.73
GLOBULIN UR ELPH-MCNC: 2.7 GM/DL (ref 1.8–3.5)
GLUCOSE BLD-MCNC: 106 MG/DL (ref 74–124)
HCT VFR BLD AUTO: 41.6 % (ref 34–45)
HGB BLD-MCNC: 13.9 G/DL (ref 11.5–14.9)
IMM GRANULOCYTES # BLD: 0.02 10*3/MM3 (ref 0–0.03)
IMM GRANULOCYTES NFR BLD: 0.3 % (ref 0–0.5)
IRON 24H UR-MRATE: 66 MCG/DL (ref 37–145)
IRON SATN MFR SERPL: 23 % (ref 14–48)
LYMPHOCYTES # BLD AUTO: 1.89 10*3/MM3 (ref 1–3.5)
LYMPHOCYTES NFR BLD AUTO: 32.4 % (ref 20–49)
MCH RBC QN AUTO: 32.6 PG (ref 27–33)
MCHC RBC AUTO-ENTMCNC: 33.4 G/DL (ref 32–35)
MCV RBC AUTO: 97.7 FL (ref 83–97)
MONOCYTES # BLD AUTO: 0.49 10*3/MM3 (ref 0.25–0.8)
MONOCYTES NFR BLD AUTO: 8.4 % (ref 4–12)
NEUTROPHILS # BLD AUTO: 3.14 10*3/MM3 (ref 1.5–7)
NEUTROPHILS NFR BLD AUTO: 54 % (ref 39–75)
NRBC BLD MANUAL-RTO: 0 /100 WBC (ref 0–0)
PLATELET # BLD AUTO: 176 10*3/MM3 (ref 150–375)
PMV BLD AUTO: 11.4 FL (ref 8.9–12.1)
POTASSIUM BLD-SCNC: 4 MMOL/L (ref 3.5–4.7)
PROT SERPL-MCNC: 6.8 G/DL (ref 6.3–8)
RBC # BLD AUTO: 4.26 10*6/MM3 (ref 3.9–5)
SODIUM BLD-SCNC: 141 MMOL/L (ref 134–145)
TIBC SERPL-MCNC: 284 MCG/DL (ref 249–505)
TRANSFERRIN SERPL-MCNC: 203 MG/DL (ref 200–360)
VIT B12 BLD-MCNC: 336 PG/ML (ref 211–946)
WBC NRBC COR # BLD: 5.83 10*3/MM3 (ref 4–10)

## 2017-11-08 PROCEDURE — 84466 ASSAY OF TRANSFERRIN: CPT

## 2017-11-08 PROCEDURE — 36415 COLL VENOUS BLD VENIPUNCTURE: CPT | Performed by: INTERNAL MEDICINE

## 2017-11-08 PROCEDURE — 80053 COMPREHEN METABOLIC PANEL: CPT | Performed by: INTERNAL MEDICINE

## 2017-11-08 PROCEDURE — 99214 OFFICE O/P EST MOD 30 MIN: CPT | Performed by: INTERNAL MEDICINE

## 2017-11-08 PROCEDURE — 82728 ASSAY OF FERRITIN: CPT

## 2017-11-08 PROCEDURE — 82607 VITAMIN B-12: CPT | Performed by: INTERNAL MEDICINE

## 2017-11-08 PROCEDURE — 85025 COMPLETE CBC W/AUTO DIFF WBC: CPT

## 2017-11-08 PROCEDURE — 83540 ASSAY OF IRON: CPT

## 2017-11-08 NOTE — PROGRESS NOTES
Subjective     CHIEF COMPLAINT:    1. Iron deficiency anemia.   2. History of Thrombocytopenia.     HISTORY OF PRESENT ILLNESS:     Terri Chaudhary is a 42 y.o.   patient with the above medical history.  She is currently on Otezla for her psoriasis.  She started about 7 weeks ago experiencing pain in the right upper quadrant abdominal.  She also started experiencing pain in the back.  She was evaluated at the ER and had CT scan of chest done that revealed no pulmonary embolism.  She then had an ultrasound of the abdomen that revealed slightly enlarged liver.  The abdominal pain improved but she continues to have the back pain.  She was evaluated by gastroenterology and had EGD done that revealed some changes of inflammation.  She is going to have a HIDA scan soon.    Patient reports feeling that she catches infections easily.  She works at her .  He state of work for 7 weeks and felt better.  When she returned back, she started having the same symptoms which are mainly sinus congestion and generalized achiness.    Patient continues to have significant fatigue.       Past Medical History:   Diagnosis Date   • Endometriosis    • H/O food allergy     allergic to all fruits and vegetables   • History of heavy periods    • History of seasonal allergies    • History of transfusion of packed red blood cells 2012   • History of umbilical hernia     Seen on CT scan -fat-containing-patient informed    • Iron deficiency anemia    • Psoriasis    • Thrombocytopenia    • Traumatic rupture of bladder     during hysterectomy       Past Surgical History:   Procedure Laterality Date   • BLADDER REPAIR     •  SECTION      x4   • COLONOSCOPY  2016    NBET.  no bx    • ENDOSCOPY  2016    Erythematous mucosa in the stomach.  PATH: Gastric mucosa with patchy minimal chronic gastritis   • ENDOSCOPY N/A 2017    Procedure: ESOPHAGOGASTRODUODENOSCOPY WITH BIOPSIES;  Surgeon: Mohit Sawyer MD;   "Location: [facility] ENDOSCOPY;  Service:    • HYSTERECTOMY  07/2014    still has ovaries   • PELVIC LAPAROSCOPY         Cancer-related family history is negative for Cancer, Breast cancer, Ovarian cancer, Uterine cancer, and Colon cancer.    SCHEDULED MEDS:       Current Outpatient Prescriptions:   •  Apremilast (OTEZLA) 30 MG tablet, Take  by mouth., Disp: , Rfl:   •  fexofenadine (ALLEGRA) 180 MG tablet, Take 180 mg by mouth Daily., Disp: , Rfl:   •  ondansetron (ZOFRAN) 4 MG tablet, Take 4 mg by mouth Every 8 (Eight) Hours As Needed for Nausea or Vomiting., Disp: , Rfl:     REVIEW OF SYSTEMS:  GENERAL:  No fever or chills. No weight change.  Worsening  fatigue.    SKIN:  Psoriasis.  HEME/LYMPH: No anemia.  ENT: Sinus and nasal congestion.  RESPIRATORY:  No cough, shortness of breath, hemoptysis or wheezing.  CVS:  No chest pain, palpitations or lower extremity swelling.  GI:  See history of present illness.  MUSCULOSKELETAL:  Generalized achiness.    Objective   VITAL SIGNS:     Vitals:    11/08/17 1407   BP: 122/84   Pulse: 74   Resp: 16   Temp: 98.6 °F (37 °C)   TempSrc: Oral   SpO2: 100%   Weight: 142 lb 12.8 oz (64.8 kg)   Height: 66\" (167.6 cm)   PainSc: 4  Comment: Abd pain x7 weeks.   PainLoc: Abdomen       Wt Readings from Last 3 Encounters:   11/08/17 142 lb 12.8 oz (64.8 kg)   11/06/17 140 lb 3.2 oz (63.6 kg)   10/19/17 138 lb 9.6 oz (62.9 kg)       PHYSICAL EXAMINATION:  GENERAL:  The patient appears in good general condition, not in acute distress.  SKIN:  No ecchymosis or petechiae.  EYES: No jaundice or pallor  CHEST: Clear bilaterally.  No added sounds.  ABD: Soft.  There was tenderness in the right upper quadrant with deep inspiration (positive Owen's sign)    .  RESULT REVIEW:     Results from last 7 days  Lab Units 11/08/17  1403   WBC 10*3/mm3 5.83   HEMOGLOBIN g/dL 13.9   HEMATOCRIT % 41.6   PLATELETS 10*3/mm3 176           RANDOM GASTRIC BIOPSIES On 11/6/17:             PATCHY MINIMAL CHRONIC " INFLAMMATION, NONSPECIFIC.            NO INTESTINAL METAPLASIA.            NO IDENTIFIED HELICOBACTER ORGANISMS WITH A SPECIAL STAIN.    CT ANGIOGRAM CHEST FOR PE, CT ABDOMEN AND PELVIS W IV CONTRAST 09/26/2017:    CT angiography of the chest: There is no pulmonary embolism. There is no aortic dissection or aortic aneurysm. Heart size is normal. No pericardial effusion. No adenopathy.    Lung windows demonstrate no pneumonic consolidation or thoracic. Miniscule pleural effusions are present. There is minimal dependent atelectasis. No fibrosis or bronchiectasis.    There are no suspicious bone lesions.    CT abdomen and pelvis: There is periportal edema as well as distention of the inferior vena cava, commonly seen in a well-hydrated state. Liver is noncirrhotic in morphology. The gallbladder is not distended. The spleen is normal in size. No pancreatic   or adrenal mass. No hydronephrosis. No aortic aneurysm. There is a tiny umbilical hernia which fat.    The appendix is normal. There is a moderate stool burden. No bowel obstruction. No evidence for diverticulitis.    There are a few pelvic phleboliths. Hysterectomy has been performed. No urinary bladder calculus. No pelvic adenopathy.    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    IMPRESSION:   1. No pulmonary embolism.  2. No infiltrate.  3. Periportal edema, likely related to a well-hydrated state.  4. Moderate stool burden. No bowel obstruction    I reviewed labs done at [facility] and they are as follows:     Comprehensive Metabolic Panel(CMP) (10/18/2017 1:48 PM)  Comprehensive Metabolic Panel(CMP) (10/18/2017 1:48 PM)   Component Value Ref Range   Sodium 143 137 - 145 mmol/L   Potassium 3.8 3.5 - 5.1 mmol/L   Chloride 100 98 - 107 mmol/L   Carbon Dioxide 28 22 - 30 mmol/L   Glucose 86 74 - 106 mg/dL   Blood Urea Nitrogen (BUN) 15 7 - 20 mg/dL   Creatinine-Blood 0.6 (L) 0.7 - 1.5 mg/dL   BUN/Creatinine Ratio 25.0 RATIO   Total Protein 7.4 6.3 - 8.2 g/dL   Albumin  4.5 3.5 - 5.0 g/dL   Globulin 2.9 1.5 - 4.5 g/dL   Albumin/Globulin Ratio 1.6 1.1 - 2.5 RATIO   Calcium 10.4 (H) 8.4 - 10.2 mg/dL   Total Bilirubin 0.7 0.2 - 1.3 mg/dL   AST/SGOT 26 15 - 46 U/L   ALT/SGPT 25 13 - 69 U/L   Alkaline Phosphatase 50 38 - 126 U/L   Estimated GFR >60      Flow cytometry 10/19/17:  IMPRESSION:    FLOW CYTOMETRIC FINDINGS REVEAL ESSENTIALLY NORMAL PERCENTAGES AND ABSOLUTE    NUMBERS OF T-CELLS, T-CELL SUBPOPULATIONS, B-CELLS, B-CELL SUBPOPULATIONS, AND    NATURAL KILLER CELLS.  A SIGNIFICANT POPULATION OF DOUBLE NEGATIVE T-CELLS IS    NOT DETECTED.  THERE IS NO EVIDENCE OF B-CELL MONOCLONALITY OR BLAST POPULATION.     IgG Subclasses (10/18/2017 1:48 PM)  IgG Subclasses (10/18/2017 1:48 PM)   Component Value Ref Range   IgG 1 Subclass 456  Comment:   (note)  REFERENCE INTERVAL: Immunoglobulin G Subclass 1  Access complete set of age- and/or gender-specific reference   intervals for this test in the [facility] Directory   ([facility]).     240 - 1,118 mg/dL   IgG 2 Subclass 397  Comment:   (note)  REFERENCE INTERVAL: Immunoglobulin G Subclass 2  Access complete set of age- and/or gender-specific reference   intervals for this test in the [facility] Test Directory   ([facility]).     124 - 549 mg/dL   IgG 3 Subclass 166 (H)  Comment:   (note)  REFERENCE INTERVAL: Immunoglobulin G Subclass 3  Access complete set of age- and/or gender-specific reference   intervals for this test in the [facility] Test Directory   ([facility]).     21 - 134 mg/dL   IGG 4 Subclass 14  Comment:   (note)  The total IgG (mg/dL) can be derived by the sum of the subclasses   IgG1, IgG2, IgG3 and IgG4 values. However, a confirmatory and more   precise total IgG is available by the nephelometric method of   total IgG (Test # [id number]).  REFERENCE INTERVAL: Immunoglobulin G Subclass 4  Access complete set of age- and/or gender-specific reference   intervals for this test in the [facility] Test  Directory   ([email or web address]).  Performed by [facility],  [address] [place] [address] [phone]  [email or web address], Troy Campbell MD, Lab. Director          Assessment/Plan    1.  Recurrent iron deficiency anemia. She Previously received IV iron.  Her iron stores are currently adequate.      2.   History of thrombocytopenia, likely autoimmune. Platelet count is now normal.    3.  Abdominal and back pain.  There is tenderness in the right upper quadrant concerning for gallbladder disease.  She is following up with GI and she is going to have HIDA scan soon.      4.  The patient feels that she has recurrent upper respiratory tract infections and was afraid that her immune system is not functioning well.  I reassured her that the blood workup done in October 2017 showed adequate immune system function.  She does not have hypogammaglobulinemia.  Flow cytometry was normal.  Lymphocyte function was normal as well.    5.  Hypercalcemia in October 2017 of unclear etiology.    PLAN:    1.  I will obtain B12 and methylmalonic acid levels.  Evaluate for B12 deficiency possibly contributing to the significant fatigue.    2.  I will repeat the CMP to reevaluate the hypercalcemia.  We will contact the patient with the results.    3.  I explained to the patient that her symptoms may be due to allergies rather than recurrent infections and we discuss taking antiallergy medicine.    4.  I will see the patient follow-up in 6 months with CBC, ferritin and iron panel.        Bea Alvarez MD  11/08/17

## 2017-11-10 ENCOUNTER — HOSPITAL ENCOUNTER (OUTPATIENT)
Dept: NUCLEAR MEDICINE | Facility: HOSPITAL | Age: 42
Discharge: HOME | End: 2017-11-10
Attending: INTERNAL MEDICINE

## 2017-11-10 DIAGNOSIS — R10.10 PAIN OF UPPER ABDOMEN: ICD-10-CM

## 2017-11-10 PROCEDURE — 25010000002 SINCALIDE PER 5 MCG: Performed by: INTERNAL MEDICINE

## 2017-11-10 PROCEDURE — 0 TECHNETIUM TC 99M MEBROFENIN KIT: Performed by: INTERNAL MEDICINE

## 2017-11-10 PROCEDURE — 78227 HEPATOBIL SYST IMAGE W/DRUG: CPT

## 2017-11-10 PROCEDURE — A9537 TC99M MEBROFENIN: HCPCS | Performed by: INTERNAL MEDICINE

## 2017-11-10 RX ORDER — KIT FOR THE PREPARATION OF TECHNETIUM TC 99M MEBROFENIN 45 MG/10ML
1 INJECTION, POWDER, LYOPHILIZED, FOR SOLUTION INTRAVENOUS
Status: COMPLETED | OUTPATIENT
Start: 2017-11-10 | End: 2017-11-10

## 2017-11-10 RX ADMIN — SINCALIDE 1.3 MCG: 5 INJECTION, POWDER, LYOPHILIZED, FOR SOLUTION INTRAVENOUS at 14:20

## 2017-11-10 RX ADMIN — MEBROFENIN 1 DOSE: 45 INJECTION, POWDER, LYOPHILIZED, FOR SOLUTION INTRAVENOUS at 13:30

## 2017-11-13 ENCOUNTER — TELEPHONE (OUTPATIENT)
Dept: ONCOLOGY | Facility: HOSPITAL | Age: 42
End: 2017-11-13

## 2017-11-13 LAB — METHYLMALONATE SERPL-SCNC: 141 NMOL/L (ref 0–378)

## 2017-11-13 NOTE — PROGRESS NOTES
Tell her that the pathology from the EGD looked good.  Lab work that she had this month looked good.  The [facility] HIDA scan showed a gallbladder ejection fraction of 35%.  A gallbladder ejection fraction of 30% or greater is considered normal.  Have her follow-up with me in the office and we can discuss what other testing to do if any

## 2017-11-13 NOTE — TELEPHONE ENCOUNTER
Called and notified patient. She /vu     ----- Message from Bea Alvarez MD sent at 11/13/2017  3:57 PM EST -----  Please inform the patient that B12 is low-normal.  Please ask her to start B12 OTC 1000 mcg daily.    Thank you

## 2017-11-14 ENCOUNTER — APPOINTMENT (OUTPATIENT)
Dept: NUCLEAR MEDICINE | Facility: HOSPITAL | Age: 42
End: 2017-11-14
Attending: INTERNAL MEDICINE

## 2017-11-15 ENCOUNTER — TELEPHONE (OUTPATIENT)
Dept: GASTROENTEROLOGY | Facility: CLINIC | Age: 42
End: 2017-11-15

## 2017-11-15 NOTE — TELEPHONE ENCOUNTER
----- Message from Mohit Sawyer MD sent at 11/13/2017 12:40 PM EST -----  Tell her that the pathology from the EGD looked good.  Lab work that she had this month looked good.  The [facility] HIDA scan showed a gallbladder ejection fraction of 35%.  A gallbladder ejection fraction of 30% or greater is considered normal.  Have her follow-up with me in the office and we can discuss what other testing to do if any

## 2017-11-15 NOTE — TELEPHONE ENCOUNTER
----- Message from Yazan Cazares sent at 11/15/2017  3:32 PM EST -----  Regarding: HIDA SCAN   Contact: [phone]  PT CALLED FOR HIDA SCAN REPORT

## 2017-11-16 ENCOUNTER — OFFICE VISIT (OUTPATIENT)
Dept: SURGERY | Facility: CLINIC | Age: 42
End: 2017-11-16

## 2017-11-16 ENCOUNTER — TELEPHONE (OUTPATIENT)
Dept: GASTROENTEROLOGY | Facility: CLINIC | Age: 42
End: 2017-11-16

## 2017-11-16 VITALS
SYSTOLIC BLOOD PRESSURE: 112 MMHG | DIASTOLIC BLOOD PRESSURE: 75 MMHG | HEIGHT: 67 IN | WEIGHT: 143.5 LBS | HEART RATE: 85 BPM | OXYGEN SATURATION: 98 % | BODY MASS INDEX: 22.52 KG/M2

## 2017-11-16 DIAGNOSIS — K82.8 BILIARY DYSKINESIA: Primary | ICD-10-CM

## 2017-11-16 PROCEDURE — 99203 OFFICE O/P NEW LOW 30 MIN: CPT | Performed by: SURGERY

## 2017-11-16 NOTE — TELEPHONE ENCOUNTER
Call from Dr Quinton So's office.  States pt being seen in office today and needs referral because of [employer].  [facility] contacted PCP and was advised that because Dr Sawyer made referral,  (see note of 11/15), that referral must come from him.    Confer with Rosanna LOPEZ - message to Scheduling to place referral.

## 2017-11-16 NOTE — TELEPHONE ENCOUNTER
I called Dr. James So.  He will see her in his office tomorrow afternoon about possible cholecystectomy.

## 2017-11-16 NOTE — PROGRESS NOTES
Subjective   Terri Chaudhary is a 42 y.o. female who presents to the office in surgical consultation from Mohit Sawyer MD for biliary dyskinesia.    History of Present Illness     The patient has had a two-month history of intermittent right upper quadrant abdominal pain with nausea.  The first episode occurred about 2 months ago and was characterized by fairly severe right upper quadrant abdominal pain that radiated to the right back and was associated with nausea.  She went to the emergency room where a CT scan of the abdomen and pelvis was performed that was normal.  Her symptoms improved except for the back pain which has remained constant.  She now has frequent epigastric and right upper quadrant abdominal pain after eating.  A right upper quadrant ultrasound was performed that was normal.  An EGD was performed that showed mild gastritis but no other abnormalities.  A HIDA scan was performed that showed decreased gallbladder ejection fraction of 35%.    Review of Systems   Constitutional: Negative for activity change, appetite change, fatigue and fever.   HENT: Negative for trouble swallowing and voice change.    Respiratory: Negative for chest tightness and shortness of breath.    Cardiovascular: Negative for chest pain and palpitations.   Gastrointestinal: Positive for abdominal pain and nausea. Negative for blood in stool, constipation, diarrhea and vomiting.   Endocrine: Negative for cold intolerance and heat intolerance.   Genitourinary: Negative for dysuria and flank pain.   Neurological: Negative for dizziness and light-headedness.   Hematological: Negative for adenopathy. Does not bruise/bleed easily.   Psychiatric/Behavioral: Negative for agitation and confusion.     Past Medical History:   Diagnosis Date   • Endometriosis    • H/O food allergy     allergic to all fruits and vegetables   • History of heavy periods    • History of seasonal allergies    • History of transfusion of packed red blood cells  2012   • History of umbilical hernia     Seen on CT scan -fat-containing-patient informed    • Iron deficiency anemia    • Psoriasis    • Thrombocytopenia    • Traumatic rupture of bladder     during hysterectomy     Past Surgical History:   Procedure Laterality Date   • BLADDER REPAIR     •  SECTION      x4   • COLONOSCOPY  2016    NBET.  no bx    • ENDOSCOPY  2016    Erythematous mucosa in the stomach.  PATH: Gastric mucosa with patchy minimal chronic gastritis   • ENDOSCOPY N/A 2017    Procedure: ESOPHAGOGASTRODUODENOSCOPY WITH BIOPSIES;  Surgeon: Mohit Sawyer MD;  Location: [facility] ENDOSCOPY;  Service:    • HYSTERECTOMY  2014    still has ovaries   • PELVIC LAPAROSCOPY       Family History   Problem Relation Age of Onset   • Aneurysm Mother      brain aneurysm   • Diabetes Father      type 2   • Deep vein thrombosis Father    • No Known Problems Sister    • No Known Problems Brother    • No Known Problems Daughter    • No Known Problems Son    • No Known Problems Maternal Aunt    • No Known Problems Maternal Uncle    • No Known Problems Paternal Aunt    • No Known Problems Paternal Uncle    • No Known Problems Maternal Grandmother    • No Known Problems Maternal Grandfather    • No Known Problems Paternal Grandmother    • No Known Problems Paternal Grandfather    • Cancer Neg Hx    • Breast cancer Neg Hx    • Ovarian cancer Neg Hx    • Uterine cancer Neg Hx    • Colon cancer Neg Hx    • Pulmonary embolism Neg Hx      Social History     Social History   • Marital status:      Spouse name: Elio   • Number of children: N/A   • Years of education: N/A     Occupational History   •  Self-Employed     Social History Main Topics   • Smoking status: Never Smoker   • Smokeless tobacco: Never Used   • Alcohol use No   • Drug use: No   • Sexual activity: Yes     Partners: Male     Birth control/ protection: Surgical     Other Topics Concern   • Not on file     Social History  Narrative       Objective   Physical Exam   Constitutional: She is oriented to person, place, and time. She appears well-developed and well-nourished.  Non-toxic appearance.   HENT:   Head: Normocephalic and atraumatic.   Eyes: EOM are normal. No scleral icterus.   Neck: Normal range of motion. Neck supple.   Pulmonary/Chest: Effort normal. No respiratory distress.   Abdominal: Soft. Normal appearance and bowel sounds are normal. She exhibits no distension. There is no tenderness. A hernia is present. Hernia confirmed positive in the ventral area (Small epigastric ventral hernia just above the umbilicus).   Neurological: She is alert and oriented to person, place, and time.   Skin: Skin is warm and dry.   Psychiatric: She has a normal mood and affect. Her behavior is normal. Judgment and thought content normal.       Assessment/Plan       The encounter diagnosis was Biliary dyskinesia.    The patient has persistent right back pain with intermittent epigastric and right upper quadrant abdominal pain that is postprandial.  She has a HIDA scan that shows a marginal gallbladder ejection fraction.  She has been diagnosed with biliary dyskinesia.  She has been scheduled for laparoscopic cholecystectomy with intraoperative cholangiogram.  The patient understands the indications, alternatives, risks, and benefits of the procedure and wishes to proceed.

## 2017-11-17 ENCOUNTER — APPOINTMENT (OUTPATIENT)
Dept: PREADMISSION TESTING | Facility: HOSPITAL | Age: 42
End: 2017-11-17

## 2017-11-17 VITALS
RESPIRATION RATE: 16 BRPM | BODY MASS INDEX: 22.47 KG/M2 | OXYGEN SATURATION: 100 % | HEART RATE: 91 BPM | WEIGHT: 143.2 LBS | HEIGHT: 67 IN | TEMPERATURE: 97.5 F | SYSTOLIC BLOOD PRESSURE: 120 MMHG | DIASTOLIC BLOOD PRESSURE: 79 MMHG

## 2017-11-27 ENCOUNTER — ANESTHESIA (OUTPATIENT)
Dept: PERIOP | Facility: HOSPITAL | Age: 42
End: 2017-11-27

## 2017-11-27 ENCOUNTER — APPOINTMENT (OUTPATIENT)
Dept: GENERAL RADIOLOGY | Facility: HOSPITAL | Age: 42
End: 2017-11-27

## 2017-11-27 ENCOUNTER — ANESTHESIA EVENT (OUTPATIENT)
Dept: PERIOP | Facility: HOSPITAL | Age: 42
End: 2017-11-27

## 2017-11-27 ENCOUNTER — HOSPITAL ENCOUNTER (OUTPATIENT)
Facility: HOSPITAL | Age: 42
Setting detail: HOSPITAL OUTPATIENT SURGERY
Discharge: HOME | End: 2017-11-27
Attending: SURGERY | Admitting: SURGERY

## 2017-11-27 VITALS
BODY MASS INDEX: 22.03 KG/M2 | RESPIRATION RATE: 16 BRPM | HEART RATE: 68 BPM | WEIGHT: 140.4 LBS | OXYGEN SATURATION: 94 % | HEIGHT: 67 IN | DIASTOLIC BLOOD PRESSURE: 61 MMHG | TEMPERATURE: 97.8 F | SYSTOLIC BLOOD PRESSURE: 111 MMHG

## 2017-11-27 DIAGNOSIS — K82.8 BILIARY DYSKINESIA: ICD-10-CM

## 2017-11-27 PROCEDURE — 25010000002 PROPOFOL 10 MG/ML EMULSION: Performed by: NURSE ANESTHETIST, CERTIFIED REGISTERED

## 2017-11-27 PROCEDURE — 0 IOPAMIDOL PER 1 ML: Performed by: SURGERY

## 2017-11-27 PROCEDURE — 25010000002 KETOROLAC TROMETHAMINE PER 15 MG: Performed by: NURSE ANESTHETIST, CERTIFIED REGISTERED

## 2017-11-27 PROCEDURE — 25010000002 ONDANSETRON PER 1 MG: Performed by: NURSE ANESTHETIST, CERTIFIED REGISTERED

## 2017-11-27 PROCEDURE — 25010000002 HYDROMORPHONE PER 4 MG: Performed by: NURSE ANESTHETIST, CERTIFIED REGISTERED

## 2017-11-27 PROCEDURE — 25010000002 MIDAZOLAM PER 1 MG: Performed by: ANESTHESIOLOGY

## 2017-11-27 PROCEDURE — 25010000002 DIPHENHYDRAMINE PER 50 MG: Performed by: NURSE ANESTHETIST, CERTIFIED REGISTERED

## 2017-11-27 PROCEDURE — 25010000002 FENTANYL CITRATE (PF) 100 MCG/2ML SOLUTION: Performed by: NURSE ANESTHETIST, CERTIFIED REGISTERED

## 2017-11-27 PROCEDURE — 74300 X-RAY BILE DUCTS/PANCREAS: CPT

## 2017-11-27 PROCEDURE — 25010000002 DEXAMETHASONE PER 1 MG: Performed by: NURSE ANESTHETIST, CERTIFIED REGISTERED

## 2017-11-27 PROCEDURE — 47563 LAPARO CHOLECYSTECTOMY/GRAPH: CPT | Performed by: SURGERY

## 2017-11-27 PROCEDURE — 25010000002 NEOSTIGMINE PER 0.5 MG: Performed by: NURSE ANESTHETIST, CERTIFIED REGISTERED

## 2017-11-27 PROCEDURE — 88304 TISSUE EXAM BY PATHOLOGIST: CPT | Performed by: SURGERY

## 2017-11-27 RX ORDER — BUPIVACAINE HYDROCHLORIDE AND EPINEPHRINE 5; 5 MG/ML; UG/ML
INJECTION, SOLUTION PERINEURAL AS NEEDED
Status: DISCONTINUED | OUTPATIENT
Start: 2017-11-27 | End: 2017-11-27 | Stop reason: HOSPADM

## 2017-11-27 RX ORDER — GLYCOPYRROLATE 0.2 MG/ML
INJECTION INTRAMUSCULAR; INTRAVENOUS AS NEEDED
Status: DISCONTINUED | OUTPATIENT
Start: 2017-11-27 | End: 2017-11-27 | Stop reason: SURG

## 2017-11-27 RX ORDER — MIDAZOLAM HYDROCHLORIDE 1 MG/ML
1 INJECTION INTRAMUSCULAR; INTRAVENOUS
Status: DISCONTINUED | OUTPATIENT
Start: 2017-11-27 | End: 2017-11-27 | Stop reason: HOSPADM

## 2017-11-27 RX ORDER — NALOXONE HCL 0.4 MG/ML
0.2 VIAL (ML) INJECTION AS NEEDED
Status: DISCONTINUED | OUTPATIENT
Start: 2017-11-27 | End: 2017-11-27 | Stop reason: HOSPADM

## 2017-11-27 RX ORDER — OXYCODONE HYDROCHLORIDE AND ACETAMINOPHEN 5; 325 MG/1; MG/1
TABLET ORAL
Qty: 20 TABLET | Refills: 0 | Status: SHIPPED | OUTPATIENT
Start: 2017-11-27 | End: 2017-12-05

## 2017-11-27 RX ORDER — SODIUM CHLORIDE 9 MG/ML
INJECTION, SOLUTION INTRAVENOUS AS NEEDED
Status: DISCONTINUED | OUTPATIENT
Start: 2017-11-27 | End: 2017-11-27 | Stop reason: HOSPADM

## 2017-11-27 RX ORDER — ONDANSETRON 2 MG/ML
4 INJECTION INTRAMUSCULAR; INTRAVENOUS ONCE AS NEEDED
Status: COMPLETED | OUTPATIENT
Start: 2017-11-27 | End: 2017-11-27

## 2017-11-27 RX ORDER — PROMETHAZINE HYDROCHLORIDE 25 MG/1
25 TABLET ORAL ONCE AS NEEDED
Status: DISCONTINUED | OUTPATIENT
Start: 2017-11-27 | End: 2017-11-27 | Stop reason: HOSPADM

## 2017-11-27 RX ORDER — MIDAZOLAM HYDROCHLORIDE 1 MG/ML
2 INJECTION INTRAMUSCULAR; INTRAVENOUS
Status: DISCONTINUED | OUTPATIENT
Start: 2017-11-27 | End: 2017-11-27 | Stop reason: HOSPADM

## 2017-11-27 RX ORDER — PROMETHAZINE HYDROCHLORIDE 25 MG/1
25 SUPPOSITORY RECTAL ONCE AS NEEDED
Status: DISCONTINUED | OUTPATIENT
Start: 2017-11-27 | End: 2017-11-27 | Stop reason: HOSPADM

## 2017-11-27 RX ORDER — ALBUTEROL SULFATE 2.5 MG/3ML
2.5 SOLUTION RESPIRATORY (INHALATION) ONCE AS NEEDED
Status: DISCONTINUED | OUTPATIENT
Start: 2017-11-27 | End: 2017-11-27 | Stop reason: HOSPADM

## 2017-11-27 RX ORDER — ROCURONIUM BROMIDE 10 MG/ML
INJECTION, SOLUTION INTRAVENOUS AS NEEDED
Status: DISCONTINUED | OUTPATIENT
Start: 2017-11-27 | End: 2017-11-27 | Stop reason: SURG

## 2017-11-27 RX ORDER — SODIUM CHLORIDE 0.9 % (FLUSH) 0.9 %
1-10 SYRINGE (ML) INJECTION AS NEEDED
Status: DISCONTINUED | OUTPATIENT
Start: 2017-11-27 | End: 2017-11-27 | Stop reason: HOSPADM

## 2017-11-27 RX ORDER — DIPHENHYDRAMINE HYDROCHLORIDE 50 MG/ML
INJECTION INTRAMUSCULAR; INTRAVENOUS AS NEEDED
Status: DISCONTINUED | OUTPATIENT
Start: 2017-11-27 | End: 2017-11-27 | Stop reason: SURG

## 2017-11-27 RX ORDER — PROMETHAZINE HYDROCHLORIDE 25 MG/ML
12.5 INJECTION, SOLUTION INTRAMUSCULAR; INTRAVENOUS ONCE AS NEEDED
Status: DISCONTINUED | OUTPATIENT
Start: 2017-11-27 | End: 2017-11-27 | Stop reason: HOSPADM

## 2017-11-27 RX ORDER — FENTANYL CITRATE 50 UG/ML
INJECTION, SOLUTION INTRAMUSCULAR; INTRAVENOUS AS NEEDED
Status: DISCONTINUED | OUTPATIENT
Start: 2017-11-27 | End: 2017-11-27 | Stop reason: SURG

## 2017-11-27 RX ORDER — HYDRALAZINE HYDROCHLORIDE 20 MG/ML
5 INJECTION INTRAMUSCULAR; INTRAVENOUS
Status: DISCONTINUED | OUTPATIENT
Start: 2017-11-27 | End: 2017-11-27 | Stop reason: HOSPADM

## 2017-11-27 RX ORDER — SODIUM CHLORIDE, SODIUM LACTATE, POTASSIUM CHLORIDE, CALCIUM CHLORIDE 600; 310; 30; 20 MG/100ML; MG/100ML; MG/100ML; MG/100ML
9 INJECTION, SOLUTION INTRAVENOUS CONTINUOUS
Status: DISCONTINUED | OUTPATIENT
Start: 2017-11-27 | End: 2017-11-27 | Stop reason: HOSPADM

## 2017-11-27 RX ORDER — CEFAZOLIN SODIUM 2 G/100ML
INJECTION, SOLUTION INTRAVENOUS
Status: DISCONTINUED
Start: 2017-11-27 | End: 2017-11-27 | Stop reason: HOSPADM

## 2017-11-27 RX ORDER — HYDROMORPHONE HYDROCHLORIDE 1 MG/ML
0.5 INJECTION, SOLUTION INTRAMUSCULAR; INTRAVENOUS; SUBCUTANEOUS
Status: DISCONTINUED | OUTPATIENT
Start: 2017-11-27 | End: 2017-11-27 | Stop reason: HOSPADM

## 2017-11-27 RX ORDER — LIDOCAINE HYDROCHLORIDE 20 MG/ML
INJECTION, SOLUTION INFILTRATION; PERINEURAL AS NEEDED
Status: DISCONTINUED | OUTPATIENT
Start: 2017-11-27 | End: 2017-11-27 | Stop reason: SURG

## 2017-11-27 RX ORDER — ACETAMINOPHEN 325 MG/1
650 TABLET ORAL ONCE AS NEEDED
Status: DISCONTINUED | OUTPATIENT
Start: 2017-11-27 | End: 2017-11-27 | Stop reason: HOSPADM

## 2017-11-27 RX ORDER — HYDROMORPHONE HCL 110MG/55ML
PATIENT CONTROLLED ANALGESIA SYRINGE INTRAVENOUS AS NEEDED
Status: DISCONTINUED | OUTPATIENT
Start: 2017-11-27 | End: 2017-11-27 | Stop reason: SURG

## 2017-11-27 RX ORDER — FENTANYL CITRATE 50 UG/ML
50 INJECTION, SOLUTION INTRAMUSCULAR; INTRAVENOUS
Status: DISCONTINUED | OUTPATIENT
Start: 2017-11-27 | End: 2017-11-27 | Stop reason: HOSPADM

## 2017-11-27 RX ORDER — IBUPROFEN 200 MG
400 TABLET ORAL EVERY 6 HOURS PRN
COMMUNITY
End: 2018-04-25

## 2017-11-27 RX ORDER — ONDANSETRON 2 MG/ML
INJECTION INTRAMUSCULAR; INTRAVENOUS AS NEEDED
Status: DISCONTINUED | OUTPATIENT
Start: 2017-11-27 | End: 2017-11-27 | Stop reason: SURG

## 2017-11-27 RX ORDER — FAMOTIDINE 10 MG/ML
20 INJECTION, SOLUTION INTRAVENOUS ONCE
Status: COMPLETED | OUTPATIENT
Start: 2017-11-27 | End: 2017-11-27

## 2017-11-27 RX ORDER — MAGNESIUM HYDROXIDE 1200 MG/15ML
LIQUID ORAL AS NEEDED
Status: DISCONTINUED | OUTPATIENT
Start: 2017-11-27 | End: 2017-11-27 | Stop reason: HOSPADM

## 2017-11-27 RX ORDER — DEXAMETHASONE SODIUM PHOSPHATE 10 MG/ML
INJECTION INTRAMUSCULAR; INTRAVENOUS AS NEEDED
Status: DISCONTINUED | OUTPATIENT
Start: 2017-11-27 | End: 2017-11-27 | Stop reason: SURG

## 2017-11-27 RX ORDER — OXYCODONE AND ACETAMINOPHEN 7.5; 325 MG/1; MG/1
1 TABLET ORAL ONCE AS NEEDED
Status: COMPLETED | OUTPATIENT
Start: 2017-11-27 | End: 2017-11-27

## 2017-11-27 RX ORDER — FLUMAZENIL 0.1 MG/ML
0.2 INJECTION INTRAVENOUS AS NEEDED
Status: DISCONTINUED | OUTPATIENT
Start: 2017-11-27 | End: 2017-11-27 | Stop reason: HOSPADM

## 2017-11-27 RX ORDER — KETOROLAC TROMETHAMINE 30 MG/ML
INJECTION, SOLUTION INTRAMUSCULAR; INTRAVENOUS AS NEEDED
Status: DISCONTINUED | OUTPATIENT
Start: 2017-11-27 | End: 2017-11-27 | Stop reason: SURG

## 2017-11-27 RX ORDER — LABETALOL HYDROCHLORIDE 5 MG/ML
5 INJECTION, SOLUTION INTRAVENOUS
Status: DISCONTINUED | OUTPATIENT
Start: 2017-11-27 | End: 2017-11-27 | Stop reason: HOSPADM

## 2017-11-27 RX ORDER — PROPOFOL 10 MG/ML
VIAL (ML) INTRAVENOUS AS NEEDED
Status: DISCONTINUED | OUTPATIENT
Start: 2017-11-27 | End: 2017-11-27 | Stop reason: SURG

## 2017-11-27 RX ADMIN — NEOSTIGMINE METHYLSULFATE 2 MG: 1 INJECTION INTRAMUSCULAR; INTRAVENOUS; SUBCUTANEOUS at 13:35

## 2017-11-27 RX ADMIN — ROCURONIUM BROMIDE 30 MG: 10 INJECTION INTRAVENOUS at 12:13

## 2017-11-27 RX ADMIN — LIDOCAINE HYDROCHLORIDE 60 MG: 20 INJECTION, SOLUTION INFILTRATION; PERINEURAL at 12:13

## 2017-11-27 RX ADMIN — DEXAMETHASONE SODIUM PHOSPHATE 8 MG: 10 INJECTION INTRAMUSCULAR; INTRAVENOUS at 12:35

## 2017-11-27 RX ADMIN — ONDANSETRON 4 MG: 2 INJECTION INTRAMUSCULAR; INTRAVENOUS at 15:19

## 2017-11-27 RX ADMIN — HYDROMORPHONE HYDROCHLORIDE 1 MG: 2 INJECTION INTRAMUSCULAR; INTRAVENOUS; SUBCUTANEOUS at 13:49

## 2017-11-27 RX ADMIN — SODIUM CHLORIDE, POTASSIUM CHLORIDE, SODIUM LACTATE AND CALCIUM CHLORIDE 9 ML/HR: 600; 310; 30; 20 INJECTION, SOLUTION INTRAVENOUS at 10:13

## 2017-11-27 RX ADMIN — FAMOTIDINE 20 MG: 10 INJECTION, SOLUTION INTRAVENOUS at 11:05

## 2017-11-27 RX ADMIN — PROPOFOL 200 MG: 10 INJECTION, EMULSION INTRAVENOUS at 12:13

## 2017-11-27 RX ADMIN — MIDAZOLAM 2 MG: 1 INJECTION INTRAMUSCULAR; INTRAVENOUS at 11:05

## 2017-11-27 RX ADMIN — FENTANYL CITRATE 50 MCG: 50 INJECTION, SOLUTION INTRAMUSCULAR; INTRAVENOUS at 13:47

## 2017-11-27 RX ADMIN — KETOROLAC TROMETHAMINE 30 MG: 30 INJECTION, SOLUTION INTRAMUSCULAR; INTRAVENOUS at 13:15

## 2017-11-27 RX ADMIN — FENTANYL CITRATE 100 MCG: 50 INJECTION, SOLUTION INTRAMUSCULAR; INTRAVENOUS at 12:10

## 2017-11-27 RX ADMIN — SODIUM CHLORIDE, POTASSIUM CHLORIDE, SODIUM LACTATE AND CALCIUM CHLORIDE: 600; 310; 30; 20 INJECTION, SOLUTION INTRAVENOUS at 12:50

## 2017-11-27 RX ADMIN — DIPHENHYDRAMINE HYDROCHLORIDE 12.5 MG: 50 INJECTION INTRAMUSCULAR; INTRAVENOUS at 12:26

## 2017-11-27 RX ADMIN — GLYCOPYRROLATE 0.5 MG: 0.2 INJECTION INTRAMUSCULAR; INTRAVENOUS at 13:35

## 2017-11-27 RX ADMIN — CEFAZOLIN SODIUM 1 G: 1 INJECTION, POWDER, FOR SOLUTION INTRAMUSCULAR; INTRAVENOUS at 12:25

## 2017-11-27 RX ADMIN — OXYCODONE HYDROCHLORIDE AND ACETAMINOPHEN 1 TABLET: 7.5; 325 TABLET ORAL at 15:39

## 2017-11-27 RX ADMIN — ONDANSETRON 4 MG: 2 INJECTION INTRAMUSCULAR; INTRAVENOUS at 13:15

## 2017-11-27 RX ADMIN — FENTANYL CITRATE 50 MCG: 50 INJECTION, SOLUTION INTRAMUSCULAR; INTRAVENOUS at 13:34

## 2017-11-27 NOTE — ANESTHESIA PROCEDURE NOTES
Airway  Urgency: elective    Airway not difficult    General Information and Staff    Patient location during procedure: OR  Anesthesiologist: LASHELL BEARD  CRNA: HEMA JOHNSON    Indications and Patient Condition  Indications for airway management: airway protection    Preoxygenated: yes (pt pre-O2 with 100% O2)  MILS not maintained throughout  Mask difficulty assessment: 2 - vent by mask + OA or adjuvant +/- NMBA (easy BMV )    Final Airway Details  Final airway type: endotracheal airway      Successful airway: ETT  Cuffed: yes   Successful intubation technique: direct laryngoscopy  Facilitating devices/methods: anterior pressure/BURP and Bougie  Endotracheal tube insertion site: oral  Blade: Neftali  Blade size: #3  ETT size: 6.5 mm  Cormack-Lehane Classification: grade IIa - partial view of glottis  Placement verified by: chest auscultation and capnometry   Cuff volume (mL): 7  Measured from: lips  ETT to lips (cm): 20  Number of attempts at approach: 1    Additional Comments  ATOETx1. No change in dentition.

## 2017-11-27 NOTE — ANESTHESIA PREPROCEDURE EVALUATION
Anesthesia Evaluation     Patient summary reviewed   no history of anesthetic complications:  NPO Solid Status: > 8 hours  NPO Liquid Status: > 2 hours     Airway   Mallampati: I  TM distance: >3 FB  Neck ROM: full  no difficulty expected  Dental          Pulmonary     breath sounds clear to auscultation  (-) shortness of breath, not a smoker  Cardiovascular   Exercise tolerance: good (4-7 METS)    Rhythm: regular  Rate: normal    (-) angina, ROGERS      Neuro/Psych  GI/Hepatic/Renal/Endo      Musculoskeletal     Abdominal    Substance History      OB/GYN          Other                                        Anesthesia Plan    ASA 1     general     intravenous induction   Anesthetic plan and risks discussed with patient and spouse/significant other.

## 2017-11-27 NOTE — DISCHARGE INSTRUCTIONS
You had IV Zofran 4mg at 3 :19pm          Dr. Quinton So and Dr. Merrill Nguyen  [address]  [place] [id number] [phone]    Discharge Instructions for Gall Bladder Surgery    1. Go home, rest and take it easy today; however, you should get up and move about several times today to reduce the risk of developing a clot in your legs.      2. You may experience some dizziness or memory loss from the anesthesia.  This may last for the next 24 hours.  Someone should plan on staying with you for the first 24 hours for your safety.    3. Do not make any important legal decisions or sign any legal papers for the next 24 hours.      4. Eat and drink lightly today.  Start off with liquids, jello, soup, crackers or other bland foods at first. You may advance your diet tomorrow as tolerated as long as you do not experience any nausea or vomiting.     5. You may remove your outer dressings in 2 days.  The white tapes called steri-strips should stay in place.  They will fall off on their own in 1-2 weeks.  Do not worry if they come off sooner.      6. You may notice some bleeding/drainage on your outer dressings. A little bloody drainage is normal. If the bleeding/drainage is such that the bandage cannot absorb it, remove the dressing, apply clean gauze and apply firm pressure for a full 15 minutes.  If the bleeding continues, please call me.    7. You may shower tomorrow.  No tub baths until your incisions are completely healed.         8. You have received a prescription for a narcotic pain medicine, as you will have some pain following surgery.   You will not be totally pain free, but your pain medicine should make the pain tolerable.  Please take your pain medicine as prescribed and always take your pills with food to prevent nausea. If you are having severe pain that cannot be controlled by the pain medicine, please contact me.      9. It is not unusual to experience pain/discomfort in your shoulders  or under your ribs after surgery.  It is from the gas used during the laparoscopic procedure and usually lasts 1-3 days.  The prescription pain medicine is used to treat the surgical pain and does not typically alleviate this “gassy” pain.     10. No driving for 24 hours and for as long as you are taking your prescription pain medicine.      11. You will need to call the office at [phone] to schedule a follow-up appointment in  2 weeks.     12. Remember to contact me for any of the following:    • Fever > 101 degrees  • Severe pain that cannot be controlled by taking your pain pills  • Severe nausea or vomiting   • Significant bleeding of your incisions  • Drainage that has a bad smell or is yellow or green in appearance  • Any other questions or concerns

## 2017-11-27 NOTE — PLAN OF CARE
Problem: Patient Care Overview (Adult)  Goal: Plan of Care Review  Outcome: Outcome(s) achieved Date Met:  11/27/17

## 2017-11-27 NOTE — PLAN OF CARE
Problem: Patient Care Overview (Adult)  Goal: Discharge Needs Assessment  Outcome: Outcome(s) achieved Date Met:  11/27/17 11/27/17 1640   Discharge Needs Assessment   Concerns To Be Addressed no discharge needs identified

## 2017-11-27 NOTE — PLAN OF CARE
Problem: Perioperative Period (Adult)  Goal: Signs and Symptoms of Listed Potential Problems Will be Absent or Manageable (Perioperative Period)  Outcome: Outcome(s) achieved Date Met:  11/27/17 11/27/17 3254   Perioperative Period   Problems Assessed (Perioperative Period) all   Problems Present (Perioperative Period) pain

## 2017-11-27 NOTE — PLAN OF CARE
Problem: Patient Care Overview (Adult)  Goal: Adult Individualization and Mutuality  Outcome: Outcome(s) achieved Date Met:  11/27/17

## 2017-11-27 NOTE — H&P (VIEW-ONLY)
Subjective   Terri Chaudhary is a 42 y.o. female who presents to the office in surgical consultation from Mohit Sawyer MD for biliary dyskinesia.    History of Present Illness     The patient has had a two-month history of intermittent right upper quadrant abdominal pain with nausea.  The first episode occurred about 2 months ago and was characterized by fairly severe right upper quadrant abdominal pain that radiated to the right back and was associated with nausea.  She went to the emergency room where a CT scan of the abdomen and pelvis was performed that was normal.  Her symptoms improved except for the back pain which has remained constant.  She now has frequent epigastric and right upper quadrant abdominal pain after eating.  A right upper quadrant ultrasound was performed that was normal.  An EGD was performed that showed mild gastritis but no other abnormalities.  A HIDA scan was performed that showed decreased gallbladder ejection fraction of 35%.    Review of Systems   Constitutional: Negative for activity change, appetite change, fatigue and fever.   HENT: Negative for trouble swallowing and voice change.    Respiratory: Negative for chest tightness and shortness of breath.    Cardiovascular: Negative for chest pain and palpitations.   Gastrointestinal: Positive for abdominal pain and nausea. Negative for blood in stool, constipation, diarrhea and vomiting.   Endocrine: Negative for cold intolerance and heat intolerance.   Genitourinary: Negative for dysuria and flank pain.   Neurological: Negative for dizziness and light-headedness.   Hematological: Negative for adenopathy. Does not bruise/bleed easily.   Psychiatric/Behavioral: Negative for agitation and confusion.     Past Medical History:   Diagnosis Date   • Endometriosis    • H/O food allergy     allergic to all fruits and vegetables   • History of heavy periods    • History of seasonal allergies    • History of transfusion of packed red blood cells  2012   • History of umbilical hernia     Seen on CT scan -fat-containing-patient informed    • Iron deficiency anemia    • Psoriasis    • Thrombocytopenia    • Traumatic rupture of bladder     during hysterectomy     Past Surgical History:   Procedure Laterality Date   • BLADDER REPAIR     •  SECTION      x4   • COLONOSCOPY  2016    NBET.  no bx    • ENDOSCOPY  2016    Erythematous mucosa in the stomach.  PATH: Gastric mucosa with patchy minimal chronic gastritis   • ENDOSCOPY N/A 2017    Procedure: ESOPHAGOGASTRODUODENOSCOPY WITH BIOPSIES;  Surgeon: Mohit Sawyer MD;  Location: [facility] ENDOSCOPY;  Service:    • HYSTERECTOMY  2014    still has ovaries   • PELVIC LAPAROSCOPY       Family History   Problem Relation Age of Onset   • Aneurysm Mother      brain aneurysm   • Diabetes Father      type 2   • Deep vein thrombosis Father    • No Known Problems Sister    • No Known Problems Brother    • No Known Problems Daughter    • No Known Problems Son    • No Known Problems Maternal Aunt    • No Known Problems Maternal Uncle    • No Known Problems Paternal Aunt    • No Known Problems Paternal Uncle    • No Known Problems Maternal Grandmother    • No Known Problems Maternal Grandfather    • No Known Problems Paternal Grandmother    • No Known Problems Paternal Grandfather    • Cancer Neg Hx    • Breast cancer Neg Hx    • Ovarian cancer Neg Hx    • Uterine cancer Neg Hx    • Colon cancer Neg Hx    • Pulmonary embolism Neg Hx      Social History     Social History   • Marital status:      Spouse name: Elio   • Number of children: N/A   • Years of education: N/A     Occupational History   •  Self-Employed     Social History Main Topics   • Smoking status: Never Smoker   • Smokeless tobacco: Never Used   • Alcohol use No   • Drug use: No   • Sexual activity: Yes     Partners: Male     Birth control/ protection: Surgical     Other Topics Concern   • Not on file     Social History  Narrative       Objective   Physical Exam   Constitutional: She is oriented to person, place, and time. She appears well-developed and well-nourished.  Non-toxic appearance.   HENT:   Head: Normocephalic and atraumatic.   Eyes: EOM are normal. No scleral icterus.   Neck: Normal range of motion. Neck supple.   Pulmonary/Chest: Effort normal. No respiratory distress.   Abdominal: Soft. Normal appearance and bowel sounds are normal. She exhibits no distension. There is no tenderness. A hernia is present. Hernia confirmed positive in the ventral area (Small epigastric ventral hernia just above the umbilicus).   Neurological: She is alert and oriented to person, place, and time.   Skin: Skin is warm and dry.   Psychiatric: She has a normal mood and affect. Her behavior is normal. Judgment and thought content normal.       Assessment/Plan       The encounter diagnosis was Biliary dyskinesia.    The patient has persistent right back pain with intermittent epigastric and right upper quadrant abdominal pain that is postprandial.  She has a HIDA scan that shows a marginal gallbladder ejection fraction.  She has been diagnosed with biliary dyskinesia.  She has been scheduled for laparoscopic cholecystectomy with intraoperative cholangiogram.  The patient understands the indications, alternatives, risks, and benefits of the procedure and wishes to proceed.

## 2017-11-27 NOTE — ANESTHESIA POSTPROCEDURE EVALUATION
"Patient: Terri Chaudhary    Procedure Summary     Date Anesthesia Start Anesthesia Stop Room / Location    11/27/17 1203 1292  [facility] / [facility]       Procedure Diagnosis Surgeon Provider    CHOLECYSTECTOMY LAPAROSCOPIC INTRAOPERATIVE CHOLANGIOGRAM (N/A Abdomen) Biliary dyskinesia  (Biliary dyskinesia [K82.8]) MD Gene Manning Jr., MD          Anesthesia Type: general  Last vitals  BP   107/70 (11/27/17 1550)   Temp   36.6 °C (97.8 °F) (11/27/17 1450)   Pulse   65 (11/27/17 1550)   Resp   16 (11/27/17 1550)     SpO2   95 % (11/27/17 1550)     Post Anesthesia Care and Evaluation    Patient location during evaluation: bedside  Patient participation: complete - patient participated  Level of consciousness: awake and alert  Pain management: adequate  Airway patency: patent  Anesthetic complications: No anesthetic complications    Cardiovascular status: acceptable  Respiratory status: acceptable  Hydration status: acceptable    Comments: /70  Pulse 65  Temp 36.6 °C (97.8 °F) (Oral)   Resp 16  Ht 67\" (170.2 cm)  Wt 140 lb 6.4 oz (63.7 kg)  SpO2 95%  BMI 21.99 kg/m2      "

## 2017-11-27 NOTE — OP NOTE
Surgeon: Quinton So Jr., M.D.    Assistant: None    Pre-Operative Diagnosis: Biliary dyskinesia [K82.8]    Post-Op Diagnosis Codes:     * Biliary dyskinesia [K82.8] and chronic cholecystitis      Procedure Performed: Laparoscopic cholecystectomy with intraoperative cholangiogram    Indications:     The patient is a very pleasant 42-year-old white female who is had recurrent symptoms of right upper quadrant abdominal pain with nausea that is postprandial in nature.  A right upper quadrant ultrasound was performed that was normal.  An EGD and found no significant abnormality.  A HIDA scan showed a marginal gallbladder ejection fraction of 35%.  She was diagnosed with biliary dyskinesia and presents for laparoscopic cholecystectomy with intraoperative cholangiogram.  The patient understands the indications, alternatives, risks, and benefits of the procedure and wishes to proceed.    Procedure:     The patient was identified and taken to the operating room where she was placed in the supine position on the operating table.  Monitors were placed and she underwent general endotracheal anesthesia and was appropriately monitored throughout the case by the anesthesia personnel.  The abdomen was prepped and draped in the standard surgical fashion.  Each incision was infiltrated with 0.5% Marcaine with epinephrine prior to making the incision.  A supraumbilical incision was made and carried through the skin into the subcutaneous tissue.  The abdominal wall was elevated with skin hooks and a Veress needle was inserted through the incision into the abdomen without difficulty.  The abdomen was then insufflated with low pressures encountered initially.  Once fully insufflated, the Veress needle was removed and a 5 mm port was placed in the same incision, again with traction applied to the abdominal wall using skin hooks.  The laparoscope was inserted and the area of Veress needle and port insertion was inspected, no injury  had occurred.  Attention was then turned to the upper abdomen.  A 10 mm subxiphoid port was placed by making skin incision carried through the skin into the subcutaneous tissue and inserting the port through the incision into the abdomen with direct visualization using the laparoscope.  Two 5 mm ports were placed in the right upper quadrant in the same fashion.  The liver edge was elevated and the gallbladder was visualized.  The gallbladder had a thickened wall.  The gallbladder was grasped and elevated over the liver.  Adhesions were taken down with blunt dissection and Bovie electrocautery.  The gallbladder was then grasped at the infundibulum and traction was applied to open the triangle of Calot.  The triangle of Calot was carefully and meticulously dissected.  The cystic duct was identified and surrounded.  A clip was placed at the infundibulum cystic duct junction.  A cholangiocatheter was introduced into the abdomen through an Angiocath.  A small incision was created in the cystic duct with scissors and the cholangiocatheter was inserted into the cystic duct.  It was secured with a clip.  A cholangiogram was then performed that confirmed our impression of the anatomy, showed a normal size common bile duct with no filling defects, and rapid drainage of contrast material into the duodenum.  The cholangiocatheter was removed and the cystic duct was divided after placing 2 clips proximally and dividing with the scissors.  The cystic artery was also identified, surrounded, and divided after placing 2 clips proximally and dividing distally using Bovie electrocautery.  The gallbladder was removed from the gallbladder fossa using Bovie electrocautery.  It was then passed out of the abdomen through the epigastric port site in an Endo Catch bag.  The gallbladder fossa was carefully inspected and noted to be hemostatic with no evidence of a bile leak.  The abdomen was deflated and all ports were removed.  The fascia of  the subxiphoid port was closed with an 0 vicryl suture in a figure-of-eight fashion.  There was a small umbilical hernia defect that was also closed with an 0 Vicryl suture in a figure-of-eight fashion.  All skin incisions were closed with a 4-0 Monocryl in a subcuticular fashion followed by benzoin and Steri-Strips.  The sponge, needle, and instrument counts were correct at the end of the case.  The patient tolerated the procedure well and was transferred to the recovery room in stable condition.    Estimated Blood Loss:  minimal      Specimens:   Order Name Source Comment Collection Info Order Time   TISSUE PATHOLOGY EXAM Gallbladder  Collected By: Quinton So Jr., MD 11/27/2017  1:14 PM

## 2017-11-27 NOTE — PLAN OF CARE
Problem: Patient Care Overview (Adult)  Goal: Plan of Care Review  Outcome: Outcome(s) achieved Date Met:  11/27/17 11/27/17 5746   Coping/Psychosocial Response Interventions   Plan Of Care Reviewed With patient;spouse   Patient Care Overview   Progress improving   Outcome Evaluation   Outcome Summary/Follow up Plan ready for d/c

## 2017-11-27 NOTE — PLAN OF CARE
Problem: Patient Care Overview (Adult)  Goal: Plan of Care Review  Outcome: Ongoing (interventions implemented as appropriate)    11/27/17 0951   Coping/Psychosocial Response Interventions   Plan Of Care Reviewed With patient   Patient Care Overview   Progress no change       Goal: Adult Individualization and Mutuality  Outcome: Ongoing (interventions implemented as appropriate)    11/27/17 0951   Individualization   Patient Specific Preferences none       Goal: Discharge Needs Assessment  Outcome: Ongoing (interventions implemented as appropriate)    11/27/17 0951   Discharge Needs Assessment   Concerns To Be Addressed no discharge needs identified         Problem: Perioperative Period (Adult)  Goal: Signs and Symptoms of Listed Potential Problems Will be Absent or Manageable (Perioperative Period)  Outcome: Ongoing (interventions implemented as appropriate)    11/27/17 0951   Perioperative Period   Problems Assessed (Perioperative Period) all   Problems Present (Perioperative Period) pain;physiologic stress response

## 2017-11-28 LAB
LAB AP CASE REPORT: NORMAL
Lab: NORMAL
PATH REPORT.FINAL DX SPEC: NORMAL
PATH REPORT.GROSS SPEC: NORMAL

## 2017-12-01 ENCOUNTER — TELEPHONE (OUTPATIENT)
Dept: GASTROENTEROLOGY | Facility: CLINIC | Age: 42
End: 2017-12-01

## 2017-12-05 ENCOUNTER — OFFICE VISIT (OUTPATIENT)
Dept: SURGERY | Facility: CLINIC | Age: 42
End: 2017-12-05

## 2017-12-05 VITALS
HEIGHT: 67 IN | HEART RATE: 77 BPM | DIASTOLIC BLOOD PRESSURE: 61 MMHG | OXYGEN SATURATION: 97 % | WEIGHT: 142.8 LBS | BODY MASS INDEX: 22.41 KG/M2 | SYSTOLIC BLOOD PRESSURE: 110 MMHG

## 2017-12-05 DIAGNOSIS — Z48.89 POSTOPERATIVE VISIT: Primary | ICD-10-CM

## 2017-12-05 PROCEDURE — 99024 POSTOP FOLLOW-UP VISIT: CPT | Performed by: SURGERY

## 2017-12-05 NOTE — PROGRESS NOTES
Subjective   Terri Chaudhary is a 42 y.o. female who returns to the office after undergoing a laparoscopic cholecystectomy with intraoperative cholangiogram on 11/27/2017.     History of Present Illness     The patient is recovering well with no significant postop symptoms.  She has had no problems with her appetite or diet but she has had some abdominal bloating after eating.  Her bowel function has been normal.  She is fatigued with a poor energy level.    Review of Systems   Constitutional: Positive for activity change and fatigue. Negative for appetite change and fever.   Respiratory: Negative for chest tightness and shortness of breath.    Cardiovascular: Negative for chest pain and palpitations.   Gastrointestinal: Negative for abdominal pain, constipation, diarrhea and nausea.   Skin: Negative for rash and wound.   Psychiatric/Behavioral: Negative for agitation and confusion.       Objective   Physical Exam   Constitutional:  Non-toxic appearance. She does not appear ill. No distress.   Pulmonary/Chest: Effort normal. No respiratory distress.   Abdominal: Soft. Normal appearance. There is no tenderness.   Neurological: She is alert.   Skin:   Incision: intact with no evidence of infection.   Psychiatric: She has a normal mood and affect. Her behavior is normal.       Assessment/Plan   The encounter diagnosis was Postoperative visit.    The patient is recovering well from her laparoscopic cholecystectomy with intraoperative cholangiogram.  She is having some symptoms that are likely related to her recovery from surgery.  She will follow-up on an as-needed basis.

## 2017-12-14 ENCOUNTER — OFFICE VISIT (OUTPATIENT)
Dept: SURGERY | Facility: CLINIC | Age: 42
End: 2017-12-14

## 2017-12-14 VITALS
DIASTOLIC BLOOD PRESSURE: 64 MMHG | HEART RATE: 91 BPM | BODY MASS INDEX: 22.46 KG/M2 | OXYGEN SATURATION: 98 % | WEIGHT: 143.4 LBS | SYSTOLIC BLOOD PRESSURE: 111 MMHG

## 2017-12-14 DIAGNOSIS — Z48.89 POSTOPERATIVE VISIT: Primary | ICD-10-CM

## 2017-12-14 PROCEDURE — 99024 POSTOP FOLLOW-UP VISIT: CPT | Performed by: SURGERY

## 2017-12-14 RX ORDER — DICYCLOMINE HYDROCHLORIDE 10 MG/1
10 CAPSULE ORAL 4 TIMES DAILY
Qty: 120 CAPSULE | Refills: 2 | Status: SHIPPED | OUTPATIENT
Start: 2017-12-14 | End: 2018-01-13

## 2017-12-18 ENCOUNTER — TELEPHONE (OUTPATIENT)
Dept: GASTROENTEROLOGY | Facility: CLINIC | Age: 42
End: 2017-12-18

## 2017-12-18 NOTE — TELEPHONE ENCOUNTER
----- Message from Maame Marie sent at 12/18/2017  2:43 PM EST -----  Regarding: pt called  Contact: [phone]   pt is calling stating dicyclomine (BENTYL) 10 MG capsule is not working for her & would like to know if their was something else she could take ??

## 2017-12-18 NOTE — TELEPHONE ENCOUNTER
Called pt and pt reports that she had her gb removed on 11/27/2017.  She states she was fine for about one week.  Then after that she developed the pain under her right rib cage and back again.  She states the pain begins right after she eats anything.  Dr ervin gave her bentyl but pt reports it does not help at all.  She is asking if there is anything else she can take or does she need to f/u with Dr Sawyer.  Advised would send message to Dr Sawyer.

## 2017-12-19 NOTE — PROGRESS NOTES
Subjective   Terri Chaudhary is a 42 y.o. female who returns to the office after undergoing a laparoscopic cholecystectomy with intraoperative cholangiogram on 11/27/2017.     History of Present Illness     The patient was recovering quite well from surgery and return to the office on 12/5/2017 with no significant symptoms outside of some mild abdominal bloating after eating.  Several days after that visit, she started having back pain after eating that would last for several hours.  It was not associated with nausea.  The back pain occurred with everything she ate.  When not eating, she had no symptoms of pain at all.    Review of Systems   Constitutional: Negative for activity change, appetite change, fatigue and fever.   Respiratory: Negative for chest tightness and shortness of breath.    Cardiovascular: Negative for chest pain and palpitations.   Gastrointestinal: Positive for abdominal pain. Negative for constipation, diarrhea and nausea.   Skin: Negative for rash and wound.   Psychiatric/Behavioral: Negative for agitation and confusion.       Objective   Physical Exam   Constitutional: She is oriented to person, place, and time. She appears well-developed and well-nourished.  Non-toxic appearance.   Eyes: EOM are normal. No scleral icterus.   Pulmonary/Chest: Effort normal. No respiratory distress.   Abdominal: Soft. Normal appearance. There is no tenderness.   Neurological: She is alert and oriented to person, place, and time.   Skin: Skin is warm and dry.   Psychiatric: She has a normal mood and affect. Her behavior is normal. Judgment and thought content normal.       Assessment/Plan   The encounter diagnosis was Postoperative visit.    The patient has developed pain after eating that is primarily in the back.  Her gallbladder was removed for biliary dyskinesia and the pain suggests there may be more global foregut dyskinesia.  She was given a prescription for Bentyl.  She will contact our office if her  symptoms continue.

## 2017-12-20 NOTE — TELEPHONE ENCOUNTER
I would be happy to see her in the office to discuss her pain. Tell her also that it has been less than one month since the surgery. lucinda

## 2017-12-20 NOTE — TELEPHONE ENCOUNTER
Called pt and advised that Dr Sawyer would be happy to see her in the office to discuss her pain. Advised pt that it has been less than a month after her surgery.  Pt verb understanding and made appt for tomorrow 12/21 at 930 am.

## 2017-12-21 ENCOUNTER — OFFICE VISIT (OUTPATIENT)
Dept: GASTROENTEROLOGY | Facility: CLINIC | Age: 42
End: 2017-12-21

## 2017-12-21 VITALS
DIASTOLIC BLOOD PRESSURE: 74 MMHG | SYSTOLIC BLOOD PRESSURE: 116 MMHG | HEIGHT: 67 IN | TEMPERATURE: 98.6 F | WEIGHT: 141.2 LBS | BODY MASS INDEX: 22.16 KG/M2

## 2017-12-21 DIAGNOSIS — R10.9 RIGHT FLANK DISCOMFORT: ICD-10-CM

## 2017-12-21 DIAGNOSIS — R10.13 EPIGASTRIC PAIN: Primary | ICD-10-CM

## 2017-12-21 PROCEDURE — 99213 OFFICE O/P EST LOW 20 MIN: CPT | Performed by: INTERNAL MEDICINE

## 2017-12-21 NOTE — PROGRESS NOTES
Chief Complaint   Patient presents with   • Abdominal Pain       History of Present Illness: She had a cholecystectomy with IOC (normal) by Dr. LOUISA So on 17. About one week after the surgery she started having right flank pain that is worse with eating. She saw Dr. Dario So last Thursday. He put her on Bentyl which doesn't help except the 20 mg dose helps a little. Yesterday her pain was really bad. Today it is better. She has gained weight. No change in BM's. No dysuria or hematuria. We reviewed her  CT abd/pelvis done at [facility]. No rectal bleeding or melena. No constipation (1-2 times/week). No diarrhea. No nausea or vomiting. No fevers, chills.     Past Medical History:   Diagnosis Date   • Biliary dyskinesia    • Endometriosis    • H/O food allergy     allergic to all fruits and vegetables   • History of heavy periods    • History of seasonal allergies    • History of transfusion of packed red blood cells 2012   • History of umbilical hernia     Seen on CT scan -fat-containing-patient informed    • Iron deficiency anemia    • Psoriasis    • Psoriatic arthritis    • Thrombocytopenia    • Traumatic rupture of bladder     during hysterectomy   • Ventral hernia        Past Surgical History:   Procedure Laterality Date   • BLADDER REPAIR     •  SECTION      x4   • CHOLECYSTECTOMY WITH INTRAOPERATIVE CHOLANGIOGRAM N/A 2017    Procedure: CHOLECYSTECTOMY LAPAROSCOPIC INTRAOPERATIVE CHOLANGIOGRAM;  Surgeon: Quinton So Jr., MD;  Location: [facility];  Service:    • COLONOSCOPY  2016    NBET.  no bx    • ENDOSCOPY  2016    Erythematous mucosa in the stomach.  PATH: Gastric mucosa with patchy minimal chronic gastritis   • ENDOSCOPY N/A 2017    patchy mild chronic inflammation   • HYSTERECTOMY  2014    still has ovaries   • PELVIC LAPAROSCOPY           Current Outpatient Prescriptions:   •  Apremilast (OTEZLA) 30 MG tablet, Take 30 mg by  mouth 2 (Two) Times a Day., Disp: , Rfl:   •  Cyanocobalamin (VITAMIN B-12 PO), Take 1,500 mcg by mouth Daily., Disp: , Rfl:   •  dicyclomine (BENTYL) 10 MG capsule, Take 1 capsule by mouth 4 (Four) Times a Day for 30 days., Disp: 120 capsule, Rfl: 2  •  DiphenhydrAMINE HCl (BENADRYL ALLERGY PO), Take 2 tablets by mouth Daily As Needed., Disp: , Rfl:   •  ibuprofen (ADVIL,MOTRIN) 200 MG tablet, Take 400 mg by mouth Every 6 (Six) Hours As Needed for Mild Pain ., Disp: , Rfl:   •  ondansetron (ZOFRAN) 4 MG tablet, Take 4 mg by mouth Every 8 (Eight) Hours As Needed for Nausea or Vomiting., Disp: , Rfl:     Allergies   Allergen Reactions   • Cefazolin Rash       Family History   Problem Relation Age of Onset   • Aneurysm Mother      brain aneurysm   • Diabetes Father      type 2   • Deep vein thrombosis Father    • No Known Problems Sister    • No Known Problems Brother    • No Known Problems Daughter    • No Known Problems Son    • No Known Problems Maternal Aunt    • No Known Problems Maternal Uncle    • No Known Problems Paternal Aunt    • No Known Problems Paternal Uncle    • No Known Problems Maternal Grandmother    • No Known Problems Maternal Grandfather    • No Known Problems Paternal Grandmother    • No Known Problems Paternal Grandfather    • Cancer Neg Hx    • Breast cancer Neg Hx    • Ovarian cancer Neg Hx    • Uterine cancer Neg Hx    • Colon cancer Neg Hx    • Pulmonary embolism Neg Hx    • Malig Hyperthermia Neg Hx        Social History     Social History   • Marital status:      Spouse name: Elio   • Number of children: N/A   • Years of education: N/A     Occupational History   •  Self-Employed     Social History Main Topics   • Smoking status: Never Smoker   • Smokeless tobacco: Never Used   • Alcohol use No   • Drug use: No   • Sexual activity: Yes     Partners: Male     Birth control/ protection: Surgical     Other Topics Concern   • Not on file     Social History Narrative       Review of  Systems   Gastrointestinal: Positive for abdominal pain.   All other systems reviewed and are negative.      Vitals:    12/21/17 1009   BP: 116/74   Temp: 98.6 °F (37 °C)       Physical Exam   Constitutional: She is oriented to person, place, and time. She appears well-developed and well-nourished. No distress.   HENT:   Head: Normocephalic and atraumatic. Hair is normal.   Right Ear: Hearing, tympanic membrane, external ear and ear canal normal. No drainage. No decreased hearing is noted.   Left Ear: Hearing, tympanic membrane, external ear and ear canal normal. No decreased hearing is noted.   Nose: No nasal deformity.   Mouth/Throat: Oropharynx is clear and moist.   Eyes: Conjunctivae, EOM and lids are normal. Pupils are equal, round, and reactive to light. Right eye exhibits no discharge. Left eye exhibits no discharge.   Neck: Normal range of motion. Neck supple. No JVD present. No tracheal deviation present. No thyromegaly present.   Cardiovascular: Normal rate, regular rhythm, normal heart sounds, intact distal pulses and normal pulses.  Exam reveals no gallop and no friction rub.    No murmur heard.  Pulmonary/Chest: Effort normal and breath sounds normal. No respiratory distress. She has no wheezes. She has no rales. She exhibits no tenderness.   Abdominal: Soft. Bowel sounds are normal. She exhibits no distension and no mass. There is tenderness. There is no rebound and no guarding. No hernia.   Mild epigastric discomfort.   Musculoskeletal: Normal range of motion. She exhibits no edema, tenderness or deformity.   Lymphadenopathy:     She has no cervical adenopathy.   Neurological: She is alert and oriented to person, place, and time. She has normal reflexes. She displays normal reflexes. No cranial nerve deficit. She exhibits normal muscle tone. Coordination normal.   Skin: Skin is warm and dry. No rash noted. She is not diaphoretic. No erythema.   Psychiatric: She has a normal mood and affect. Her  behavior is normal. Judgment and thought content normal.   Vitals reviewed.      Terri was seen today for abdominal pain.    Diagnoses and all orders for this visit:    Epigastric pain  -     MRI abdomen w contrast mrcp; Future  -     Amylase  -     CBC & Differential  -     Comprehensive Metabolic Panel  -     Lipase  -     Urinalysis With / Culture If Indicated - Urine, Clean Catch    Right flank discomfort  -     MRI abdomen w contrast mrcp; Future  -     Amylase  -     CBC & Differential  -     Comprehensive Metabolic Panel  -     Lipase  -     Urinalysis With / Culture If Indicated - Urine, Clean Catch       Assessment:  1) Right flank pain  2) s/p cholecystectomy 11/27/17.    Recommendations:  1) Labs: CBC, CMP, UA  2) MRCP  3) F/U in one week.      Return in about 1 week (around 12/28/2017).    Mohit Sawyer MD  12/21/2017

## 2017-12-25 LAB
ALBUMIN SERPL-MCNC: 4.9 G/DL (ref 3.5–5.2)
ALBUMIN/GLOB SERPL: 2.2 G/DL
ALP SERPL-CCNC: 47 U/L (ref 39–117)
ALT SERPL-CCNC: 12 U/L (ref 1–33)
AMYLASE SERPL-CCNC: 39 U/L (ref 28–100)
APPEARANCE UR: ABNORMAL
AST SERPL-CCNC: 14 U/L (ref 1–32)
BACTERIA #/AREA URNS HPF: NORMAL /HPF
BACTERIA UR CULT: NORMAL
BACTERIA UR CULT: NORMAL
BASOPHILS # BLD AUTO: 0.02 10*3/MM3 (ref 0–0.2)
BASOPHILS NFR BLD AUTO: 0.4 % (ref 0–1.5)
BILIRUB SERPL-MCNC: 0.8 MG/DL (ref 0.1–1.2)
BILIRUB UR QL STRIP: NEGATIVE
BUN SERPL-MCNC: 13 MG/DL (ref 6–20)
BUN/CREAT SERPL: 19.1 (ref 7–25)
CALCIUM SERPL-MCNC: 9.4 MG/DL (ref 8.6–10.5)
CHLORIDE SERPL-SCNC: 102 MMOL/L (ref 98–107)
CO2 SERPL-SCNC: 27.9 MMOL/L (ref 22–29)
COLOR UR: YELLOW
CREAT SERPL-MCNC: 0.68 MG/DL (ref 0.57–1)
EOSINOPHIL # BLD AUTO: 0.28 10*3/MM3 (ref 0–0.7)
EOSINOPHIL NFR BLD AUTO: 5.4 % (ref 0.3–6.2)
EPI CELLS #/AREA URNS HPF: NORMAL /HPF
ERYTHROCYTE [DISTWIDTH] IN BLOOD BY AUTOMATED COUNT: 12.1 % (ref 11.7–13)
GLOBULIN SER CALC-MCNC: 2.2 GM/DL
GLUCOSE SERPL-MCNC: 92 MG/DL (ref 65–99)
GLUCOSE UR QL: NEGATIVE
HCT VFR BLD AUTO: 42.5 % (ref 35.6–45.5)
HGB BLD-MCNC: 14.1 G/DL (ref 11.9–15.5)
HGB UR QL STRIP: NEGATIVE
IMM GRANULOCYTES # BLD: 0 10*3/MM3 (ref 0–0.03)
IMM GRANULOCYTES NFR BLD: 0 % (ref 0–0.5)
KETONES UR QL STRIP: NEGATIVE
LEUKOCYTE ESTERASE UR QL STRIP: ABNORMAL
LIPASE SERPL-CCNC: 25 U/L (ref 13–60)
LYMPHOCYTES # BLD AUTO: 1.92 10*3/MM3 (ref 0.9–4.8)
LYMPHOCYTES NFR BLD AUTO: 37.4 % (ref 19.6–45.3)
MCH RBC QN AUTO: 32.6 PG (ref 26.9–32)
MCHC RBC AUTO-ENTMCNC: 33.2 G/DL (ref 32.4–36.3)
MCV RBC AUTO: 98.4 FL (ref 80.5–98.2)
MICRO URNS: ABNORMAL
MONOCYTES # BLD AUTO: 0.5 10*3/MM3 (ref 0.2–1.2)
MONOCYTES NFR BLD AUTO: 9.7 % (ref 5–12)
MUCOUS THREADS URNS QL MICRO: PRESENT /HPF
NEUTROPHILS # BLD AUTO: 2.42 10*3/MM3 (ref 1.9–8.1)
NEUTROPHILS NFR BLD AUTO: 47.1 % (ref 42.7–76)
NITRITE UR QL STRIP: NEGATIVE
PH UR STRIP: 7 [PH] (ref 5–7.5)
PLATELET # BLD AUTO: 180 10*3/MM3 (ref 140–500)
POTASSIUM SERPL-SCNC: 4.5 MMOL/L (ref 3.5–5.2)
PROT SERPL-MCNC: 7.1 G/DL (ref 6–8.5)
PROT UR QL STRIP: NEGATIVE
RBC # BLD AUTO: 4.32 10*6/MM3 (ref 3.9–5.2)
RBC #/AREA URNS HPF: NORMAL /HPF
SODIUM SERPL-SCNC: 141 MMOL/L (ref 136–145)
SP GR UR: 1.02 (ref 1–1.03)
URINALYSIS REFLEX: ABNORMAL
UROBILINOGEN UR STRIP-MCNC: 1 MG/DL (ref 0.2–1)
WBC # BLD AUTO: 5.14 10*3/MM3 (ref 4.5–10.7)
WBC #/AREA URNS HPF: NORMAL /HPF

## 2017-12-26 ENCOUNTER — TELEPHONE (OUTPATIENT)
Dept: GASTROENTEROLOGY | Facility: CLINIC | Age: 42
End: 2017-12-26

## 2017-12-26 NOTE — PROGRESS NOTES
Tell her that her labs looked mostly OK. Her urine culture did grow some bacteria but I think that it is a contaminant because there are no WBC's in the urine.We'll see what the MRI shows. lucinda

## 2017-12-30 ENCOUNTER — HOSPITAL ENCOUNTER (OUTPATIENT)
Dept: MRI IMAGING | Facility: HOSPITAL | Age: 42
Discharge: HOME | End: 2017-12-30
Attending: INTERNAL MEDICINE | Admitting: INTERNAL MEDICINE

## 2017-12-30 DIAGNOSIS — R10.13 EPIGASTRIC PAIN: ICD-10-CM

## 2017-12-30 DIAGNOSIS — R10.9 RIGHT FLANK DISCOMFORT: ICD-10-CM

## 2017-12-30 PROCEDURE — A9577 INJ MULTIHANCE: HCPCS | Performed by: INTERNAL MEDICINE

## 2017-12-30 PROCEDURE — 0 GADOBENATE DIMEGLUMINE 529 MG/ML SOLUTION: Performed by: INTERNAL MEDICINE

## 2017-12-30 PROCEDURE — 74183 MRI ABD W/O CNTR FLWD CNTR: CPT

## 2017-12-30 RX ADMIN — GADOBENATE DIMEGLUMINE 13 ML: 529 INJECTION, SOLUTION INTRAVENOUS at 10:15

## 2018-01-03 ENCOUNTER — OFFICE VISIT (OUTPATIENT)
Dept: GASTROENTEROLOGY | Facility: CLINIC | Age: 43
End: 2018-01-03

## 2018-01-03 VITALS
DIASTOLIC BLOOD PRESSURE: 80 MMHG | SYSTOLIC BLOOD PRESSURE: 108 MMHG | TEMPERATURE: 98 F | HEIGHT: 67 IN | BODY MASS INDEX: 22.66 KG/M2 | WEIGHT: 144.4 LBS

## 2018-01-03 DIAGNOSIS — R10.9 LEFT FLANK DISCOMFORT: ICD-10-CM

## 2018-01-03 DIAGNOSIS — R53.83 MALAISE AND FATIGUE: Primary | ICD-10-CM

## 2018-01-03 DIAGNOSIS — R10.9 RIGHT FLANK DISCOMFORT: ICD-10-CM

## 2018-01-03 DIAGNOSIS — R53.81 MALAISE AND FATIGUE: Primary | ICD-10-CM

## 2018-01-03 PROCEDURE — 99213 OFFICE O/P EST LOW 20 MIN: CPT | Performed by: INTERNAL MEDICINE

## 2018-01-03 NOTE — PROGRESS NOTES
Chief Complaint   Patient presents with   • Abdominal Pain       History of Present Illness: She is now on day 6 of flu like symptoms. She still has right and left flank pain and mid/low back pain. It is worse when she eats. Some nausea but no vomiting. Sometimes diarrhea, sometimes constipaiton and sometimes normal. Stool not marjorie colored, no rectal bleeding or melena. No recent fevers, chills. She is coughing (dry). Her son has pneumonia. She has gained weight.     Past Medical History:   Diagnosis Date   • Biliary dyskinesia    • Endometriosis    • H/O food allergy     allergic to all fruits and vegetables   • History of heavy periods    • History of seasonal allergies    • History of transfusion of packed red blood cells 2012   • History of umbilical hernia     Seen on CT scan -fat-containing-patient informed    • Iron deficiency anemia    • Psoriasis    • Psoriatic arthritis    • Thrombocytopenia    • Traumatic rupture of bladder     during hysterectomy   • Ventral hernia        Past Surgical History:   Procedure Laterality Date   • BLADDER REPAIR     •  SECTION      x4   • CHOLECYSTECTOMY WITH INTRAOPERATIVE CHOLANGIOGRAM N/A 2017    Procedure: CHOLECYSTECTOMY LAPAROSCOPIC INTRAOPERATIVE CHOLANGIOGRAM;  Surgeon: Quinton So Jr., MD;  Location: [facility];  Service:    • COLONOSCOPY  2016    NBET.  no bx    • ENDOSCOPY  2016    Erythematous mucosa in the stomach.  PATH: Gastric mucosa with patchy minimal chronic gastritis   • ENDOSCOPY N/A 2017    patchy mild chronic inflammation   • HYSTERECTOMY  2014    still has ovaries   • PELVIC LAPAROSCOPY           Current Outpatient Prescriptions:   •  Apremilast (OTEZLA) 30 MG tablet, Take 30 mg by mouth 2 (Two) Times a Day., Disp: , Rfl:   •  Cyanocobalamin (VITAMIN B-12 PO), Take 1,500 mcg by mouth Daily., Disp: , Rfl:   •  dicyclomine (BENTYL) 10 MG capsule, Take 1 capsule by mouth 4 (Four) Times a Day for 30  days., Disp: 120 capsule, Rfl: 2  •  DiphenhydrAMINE HCl (BENADRYL ALLERGY PO), Take 2 tablets by mouth Daily As Needed., Disp: , Rfl:   •  ibuprofen (ADVIL,MOTRIN) 200 MG tablet, Take 400 mg by mouth Every 6 (Six) Hours As Needed for Mild Pain ., Disp: , Rfl:   •  ondansetron (ZOFRAN) 4 MG tablet, Take 4 mg by mouth Every 8 (Eight) Hours As Needed for Nausea or Vomiting., Disp: , Rfl:     Allergies   Allergen Reactions   • Cefazolin Rash       Family History   Problem Relation Age of Onset   • Aneurysm Mother      brain aneurysm   • Diabetes Father      type 2   • Deep vein thrombosis Father    • No Known Problems Sister    • No Known Problems Brother    • No Known Problems Daughter    • No Known Problems Son    • No Known Problems Maternal Aunt    • No Known Problems Maternal Uncle    • No Known Problems Paternal Aunt    • No Known Problems Paternal Uncle    • No Known Problems Maternal Grandmother    • No Known Problems Maternal Grandfather    • No Known Problems Paternal Grandmother    • No Known Problems Paternal Grandfather    • Cancer Neg Hx    • Breast cancer Neg Hx    • Ovarian cancer Neg Hx    • Uterine cancer Neg Hx    • Colon cancer Neg Hx    • Pulmonary embolism Neg Hx    • Malig Hyperthermia Neg Hx        Social History     Social History   • Marital status:      Spouse name: Elio   • Number of children: N/A   • Years of education: N/A     Occupational History   •  Self-Employed     Social History Main Topics   • Smoking status: Never Smoker   • Smokeless tobacco: Never Used   • Alcohol use No   • Drug use: No   • Sexual activity: Yes     Partners: Male     Birth control/ protection: Surgical     Other Topics Concern   • Not on file     Social History Narrative       Review of Systems   All other systems reviewed and are negative.      Vitals:    01/03/18 1145   BP: 108/80   Temp: 98 °F (36.7 °C)       Physical Exam   Constitutional: She is oriented to person, place, and time. She appears  well-developed and well-nourished. No distress.   HENT:   Head: Normocephalic and atraumatic. Hair is normal.   Right Ear: Hearing, tympanic membrane, external ear and ear canal normal. No drainage. No decreased hearing is noted.   Left Ear: Hearing, tympanic membrane, external ear and ear canal normal. No decreased hearing is noted.   Nose: No nasal deformity.   Mouth/Throat: Oropharynx is clear and moist.   Eyes: Conjunctivae, EOM and lids are normal. Pupils are equal, round, and reactive to light. Right eye exhibits no discharge. Left eye exhibits no discharge.   Neck: Normal range of motion. Neck supple. No JVD present. No tracheal deviation present. No thyromegaly present.   Cardiovascular: Normal rate, regular rhythm, normal heart sounds, intact distal pulses and normal pulses.  Exam reveals no gallop and no friction rub.    No murmur heard.  Pulmonary/Chest: Effort normal and breath sounds normal. No respiratory distress. She has no wheezes. She has no rales. She exhibits no tenderness.   Abdominal: Soft. Bowel sounds are normal. She exhibits no distension and no mass. There is no tenderness. There is no rebound and no guarding. No hernia.   Musculoskeletal: Normal range of motion. She exhibits no edema, tenderness or deformity.   Lymphadenopathy:     She has no cervical adenopathy.   Neurological: She is alert and oriented to person, place, and time. She has normal reflexes. She displays normal reflexes. No cranial nerve deficit. She exhibits normal muscle tone. Coordination normal.   Skin: Skin is warm and dry. No rash noted. She is not diaphoretic. No erythema.   Psychiatric: She has a normal mood and affect. Her behavior is normal. Judgment and thought content normal.   Vitals reviewed.      There are no diagnoses linked to this encounter.   Assessment:  1) Right and left flank discomfort after 11/27/17 Lap herrera with IOC (normal).  2) No energy.    Recommendations:  1) I want to get the results of her  MRCP  2) TSH, CBC, CMP  3) If the labs and MRCP are unrevealing consider a repeat CT scan of the abd/pelvis to compare to the 9/17 CT scan done at [facility].  4) She will call for results.     No Follow-up on file.    Mohit Sawyer MD  1/3/2018

## 2018-01-04 ENCOUNTER — TELEPHONE (OUTPATIENT)
Dept: GASTROENTEROLOGY | Facility: CLINIC | Age: 43
End: 2018-01-04

## 2018-01-04 DIAGNOSIS — R10.9 BILATERAL FLANK PAIN: Primary | ICD-10-CM

## 2018-01-04 LAB
ALBUMIN SERPL-MCNC: 4.2 G/DL (ref 3.5–5.2)
ALBUMIN/GLOB SERPL: 1.6 G/DL
ALP SERPL-CCNC: 49 U/L (ref 39–117)
ALT SERPL-CCNC: 12 U/L (ref 1–33)
AST SERPL-CCNC: 12 U/L (ref 1–32)
BASOPHILS # BLD AUTO: 0.02 10*3/MM3 (ref 0–0.2)
BASOPHILS NFR BLD AUTO: 0.4 % (ref 0–1.5)
BILIRUB SERPL-MCNC: 0.2 MG/DL (ref 0.1–1.2)
BUN SERPL-MCNC: 15 MG/DL (ref 6–20)
BUN/CREAT SERPL: 21.1 (ref 7–25)
CALCIUM SERPL-MCNC: 9.3 MG/DL (ref 8.6–10.5)
CHLORIDE SERPL-SCNC: 104 MMOL/L (ref 98–107)
CO2 SERPL-SCNC: 28.2 MMOL/L (ref 22–29)
CREAT SERPL-MCNC: 0.71 MG/DL (ref 0.57–1)
EOSINOPHIL # BLD AUTO: 0.47 10*3/MM3 (ref 0–0.7)
EOSINOPHIL NFR BLD AUTO: 9.2 % (ref 0.3–6.2)
ERYTHROCYTE [DISTWIDTH] IN BLOOD BY AUTOMATED COUNT: 12.1 % (ref 11.7–13)
GLOBULIN SER CALC-MCNC: 2.7 GM/DL
GLUCOSE SERPL-MCNC: 95 MG/DL (ref 65–99)
HCT VFR BLD AUTO: 42.6 % (ref 35.6–45.5)
HGB BLD-MCNC: 14 G/DL (ref 11.9–15.5)
IMM GRANULOCYTES # BLD: 0 10*3/MM3 (ref 0–0.03)
IMM GRANULOCYTES NFR BLD: 0 % (ref 0–0.5)
LYMPHOCYTES # BLD AUTO: 1.75 10*3/MM3 (ref 0.9–4.8)
LYMPHOCYTES NFR BLD AUTO: 34.3 % (ref 19.6–45.3)
MCH RBC QN AUTO: 32.6 PG (ref 26.9–32)
MCHC RBC AUTO-ENTMCNC: 32.9 G/DL (ref 32.4–36.3)
MCV RBC AUTO: 99.1 FL (ref 80.5–98.2)
MONOCYTES # BLD AUTO: 0.46 10*3/MM3 (ref 0.2–1.2)
MONOCYTES NFR BLD AUTO: 9 % (ref 5–12)
NEUTROPHILS # BLD AUTO: 2.4 10*3/MM3 (ref 1.9–8.1)
NEUTROPHILS NFR BLD AUTO: 47.1 % (ref 42.7–76)
PLATELET # BLD AUTO: 152 10*3/MM3 (ref 140–500)
POTASSIUM SERPL-SCNC: 4.5 MMOL/L (ref 3.5–5.2)
PROT SERPL-MCNC: 6.9 G/DL (ref 6–8.5)
RBC # BLD AUTO: 4.3 10*6/MM3 (ref 3.9–5.2)
SODIUM SERPL-SCNC: 144 MMOL/L (ref 136–145)
TSH SERPL DL<=0.005 MIU/L-ACNC: 4.87 MIU/ML (ref 0.27–4.2)
WBC # BLD AUTO: 5.1 10*3/MM3 (ref 4.5–10.7)

## 2018-01-04 NOTE — PROGRESS NOTES
Tell her that her MRI of the abdomen came back normal.  Her labs also look mostly okay.  Her CBC was normal.  Her thyroid stimulating hormone is mildly elevated.  I would recommend that she go back to her PCP and have her thyroid rechecked.  Did we get a urinalysis on her?  If not we'll get a urinalysis on her to evaluate bilateral flank pain. (Please order the UA with culture if needed and I will cosign). Also would recommend that we repeat a CAT scan of the abdomen and pelvis to evaluate her bilateral flank pain. (I will order the CT abd/pelvis with pancreatic protocol to evaluate her bilateral flank pain to be done at [facility]). I would want her to go to the old [facility] radiology department to get her old CAT scan of the abdomen and pelvis from 9 of 2017 on a CD.  She could then take that CAT scan result (that is on a CD) to [facility] Dept the day that she has her repeat CAT scan of the abd/pelvis with pancreatic protocol.  She could give the  technician the CD from [facility] and have the technician given to the Radiologist so that the Radiologist could compare the CAT scan of the abdomen and pelvis that we are going to order with the CAT scan of the abdomen and pelvis that was done in 9 of 2017 at [facility].  Have the patient call me in one to 2 days after the repeat CAT scan of the abdomen and pelvis is done to go over the results. Bobbi jane

## 2018-01-04 NOTE — TELEPHONE ENCOUNTER
Call to pt.  Advise per DR Sawyer that MRI of abd came back normal.  Labs look mostly ok.  CBC was normal.  TSH mildly elevated.  Recommend f/u with PCP to have thyroid rechecked.    Last UA done 12/21 - none done 1/3.  Advise pt will obtain UA to evaluate bilateral flank pain.  Repeat CT abd/pelvis to evaluate same.  Pt to obtain disc of CT abd/pelvis done at [facility] 9/2017 and bring with to [facility] Radiology on day of appt so that results can be compared.  Contact Dr Sawyer 1-2 days after repeat CT to go over results.    Pt verb understanding.    Lab appt scheduled for 1/5/18 @ 1100.  Order placed for UA C&S - message to Dr Sawyer.

## 2018-01-04 NOTE — TELEPHONE ENCOUNTER
----- Message from Mohit Sawyer MD sent at 1/4/2018  7:38 AM EST -----  Tell her that her MRI of the abdomen came back normal.  Her labs also look mostly okay.  Her CBC was normal.  Her thyroid stimulating hormone is mildly elevated.  I would recommend that she go back to her PCP and have her thyroid rechecked.  Did we get a urinalysis on her?  If not we'll get a urinalysis on her to evaluate bilateral flank pain. (Please order the UA with culture if needed and I will cosign). Also would recommend that we repeat a CAT scan of the abdomen and pelvis to evaluate her bilateral flank pain. (I will order the CT abd/pelvis with pancreatic protocol to evaluate her bilateral flank pain to be done at [facility]). I would want her to go to the [facility] radiology department to get her old CAT scan of the abdomen and pelvis from 9 of 2017 on a CD.  She could then take that CAT scan result (that is on a CD) to [facility] Radiology Dept the day that she has her repeat CAT scan of the abd/pelvis with pancreatic protocol.  She could give the  technician the CD from [facility] and have the technician given to the Radiologist so that the Radiologist could compare the CAT scan of the abdomen and pelvis that we are going to order with the CAT scan of the abdomen and pelvis that was done in 9 of 2017 at [facility].  Have the patient call me in one to 2 days after the repeat CAT scan of the abdomen and pelvis is done to go over the results. THx. kjh

## 2018-01-06 LAB
APPEARANCE UR: CLEAR
BACTERIA #/AREA URNS HPF: NORMAL /HPF
BILIRUB UR QL STRIP: NEGATIVE
COLOR UR: YELLOW
EPI CELLS #/AREA URNS HPF: NORMAL /HPF
GLUCOSE UR QL: NEGATIVE
HGB UR QL STRIP: NEGATIVE
KETONES UR QL STRIP: NEGATIVE
LEUKOCYTE ESTERASE UR QL STRIP: NEGATIVE
MICRO URNS: NORMAL
MICRO URNS: NORMAL
MUCOUS THREADS URNS QL MICRO: PRESENT /HPF
NITRITE UR QL STRIP: NEGATIVE
PH UR STRIP: 6 [PH] (ref 5–7.5)
PROT UR QL STRIP: NEGATIVE
RBC #/AREA URNS HPF: NORMAL /HPF
SP GR UR: 1.01 (ref 1–1.03)
URINALYSIS REFLEX: NORMAL
UROBILINOGEN UR STRIP-MCNC: 0.2 MG/DL (ref 0.2–1)
WBC #/AREA URNS HPF: NORMAL /HPF

## 2018-01-11 ENCOUNTER — TELEPHONE (OUTPATIENT)
Dept: GASTROENTEROLOGY | Facility: CLINIC | Age: 43
End: 2018-01-11

## 2018-01-11 NOTE — TELEPHONE ENCOUNTER
----- Message from Mohit Sawyer MD sent at 1/7/2018  8:20 PM EST -----  Tell her that her urinalysis came back normal. lucinda

## 2018-01-12 ENCOUNTER — APPOINTMENT (OUTPATIENT)
Dept: CT IMAGING | Facility: HOSPITAL | Age: 43
End: 2018-01-12
Attending: INTERNAL MEDICINE

## 2018-01-17 ENCOUNTER — HOSPITAL ENCOUNTER (OUTPATIENT)
Dept: CT IMAGING | Facility: HOSPITAL | Age: 43
Discharge: HOME | End: 2018-01-17
Attending: INTERNAL MEDICINE | Admitting: INTERNAL MEDICINE

## 2018-01-17 DIAGNOSIS — R10.9 BILATERAL FLANK PAIN: ICD-10-CM

## 2018-01-17 PROCEDURE — 0 IOPAMIDOL 61 % SOLUTION: Performed by: INTERNAL MEDICINE

## 2018-01-17 PROCEDURE — 0 DIATRIZOATE MEGLUMINE & SODIUM PER 1 ML: Performed by: INTERNAL MEDICINE

## 2018-01-17 PROCEDURE — 74177 CT ABD & PELVIS W/CONTRAST: CPT

## 2018-01-17 RX ADMIN — DIATRIZOATE MEGLUMINE AND DIATRIZOATE SODIUM 30 ML: 660; 100 LIQUID ORAL; RECTAL at 10:30

## 2018-01-17 RX ADMIN — IOPAMIDOL 85 ML: 612 INJECTION, SOLUTION INTRAVENOUS at 11:35

## 2018-01-23 NOTE — PROGRESS NOTES
Tell her that her CT abd/pelvis came back looking normal. Have her f/u with me and we could discuss what other tests to do next (if anything?). lucinda

## 2018-01-24 ENCOUNTER — OFFICE VISIT (OUTPATIENT)
Dept: GASTROENTEROLOGY | Facility: CLINIC | Age: 43
End: 2018-01-24

## 2018-01-24 ENCOUNTER — TELEPHONE (OUTPATIENT)
Dept: GASTROENTEROLOGY | Facility: CLINIC | Age: 43
End: 2018-01-24

## 2018-01-24 VITALS
BODY MASS INDEX: 22.7 KG/M2 | HEIGHT: 67 IN | TEMPERATURE: 98.4 F | WEIGHT: 144.6 LBS | SYSTOLIC BLOOD PRESSURE: 102 MMHG | DIASTOLIC BLOOD PRESSURE: 70 MMHG

## 2018-01-24 DIAGNOSIS — R10.9 RIGHT FLANK DISCOMFORT: Primary | ICD-10-CM

## 2018-01-24 PROCEDURE — 99213 OFFICE O/P EST LOW 20 MIN: CPT | Performed by: INTERNAL MEDICINE

## 2018-01-24 RX ORDER — DICYCLOMINE HCL 20 MG
20 TABLET ORAL
Qty: 120 TABLET | Refills: 5 | Status: SHIPPED | OUTPATIENT
Start: 2018-01-24 | End: 2018-02-23

## 2018-01-24 NOTE — TELEPHONE ENCOUNTER
Called pt and advised per Dr Sawyer that her ct abd/pelvis came back looking normal.  Advised her to f/u with Dr Sawyer to discuss what other tests to do next if any.  Advised would send message to manager for appt. Pt verb understanding.

## 2018-01-24 NOTE — PROGRESS NOTES
Chief Complaint   Patient presents with   • Abdominal Pain       History of Present Illness: She had an MRCP in  and CT abd/pelvis in . She is taking OTC digestive enzymes and that helps. No back pain though it is still there but not as bad. Laying on a heating pad helps. No nausea or vomiting. No fevers, chills. NO constiaption or diarrhea. No rectal bleeding or melena. She has gained some weight. Bentyl did help in the past.     Past Medical History:   Diagnosis Date   • Biliary dyskinesia    • Endometriosis    • H/O food allergy     allergic to all fruits and vegetables   • History of heavy periods    • History of seasonal allergies    • History of transfusion of packed red blood cells 2012   • History of umbilical hernia     Seen on CT scan -fat-containing-patient informed    • Iron deficiency anemia    • Psoriasis    • Psoriatic arthritis    • Thrombocytopenia    • Traumatic rupture of bladder     during hysterectomy   • Ventral hernia        Past Surgical History:   Procedure Laterality Date   • BLADDER REPAIR     •  SECTION      x4   • CHOLECYSTECTOMY WITH INTRAOPERATIVE CHOLANGIOGRAM N/A 2017    Procedure: CHOLECYSTECTOMY LAPAROSCOPIC INTRAOPERATIVE CHOLANGIOGRAM;  Surgeon: Quinton So Jr., MD;  Location: [facility];  Service:    • COLONOSCOPY  2016    NBET.  no bx    • ENDOSCOPY  2016    Erythematous mucosa in the stomach.  PATH: Gastric mucosa with patchy minimal chronic gastritis   • ENDOSCOPY N/A 2017    patchy mild chronic inflammation   • HYSTERECTOMY  2014    still has ovaries   • PELVIC LAPAROSCOPY           Current Outpatient Prescriptions:   •  Apremilast (OTEZLA) 30 MG tablet, Take 30 mg by mouth 2 (Two) Times a Day., Disp: , Rfl:   •  Cyanocobalamin (VITAMIN B-12 PO), Take 1,500 mcg by mouth Daily., Disp: , Rfl:   •  DiphenhydrAMINE HCl (BENADRYL ALLERGY PO), Take 2 tablets by mouth Daily As Needed., Disp: , Rfl:   •  ibuprofen  (ADVIL,MOTRIN) 200 MG tablet, Take 400 mg by mouth Every 6 (Six) Hours As Needed for Mild Pain ., Disp: , Rfl:   •  ondansetron (ZOFRAN) 4 MG tablet, Take 4 mg by mouth Every 8 (Eight) Hours As Needed for Nausea or Vomiting., Disp: , Rfl:   •  dicyclomine (BENTYL) 20 MG tablet, Take 1 tablet by mouth 4 (Four) Times a Day Before Meals & at Bedtime As Needed (cramping) for up to 30 days., Disp: 120 tablet, Rfl: 5    No Known Allergies    Family History   Problem Relation Age of Onset   • Aneurysm Mother      brain aneurysm   • Diabetes Father      type 2   • Deep vein thrombosis Father    • No Known Problems Sister    • No Known Problems Brother    • No Known Problems Daughter    • No Known Problems Son    • No Known Problems Maternal Aunt    • No Known Problems Maternal Uncle    • No Known Problems Paternal Aunt    • No Known Problems Paternal Uncle    • No Known Problems Maternal Grandmother    • No Known Problems Maternal Grandfather    • No Known Problems Paternal Grandmother    • No Known Problems Paternal Grandfather    • Cancer Neg Hx    • Breast cancer Neg Hx    • Ovarian cancer Neg Hx    • Uterine cancer Neg Hx    • Colon cancer Neg Hx    • Pulmonary embolism Neg Hx    • Malig Hyperthermia Neg Hx        Social History     Social History   • Marital status:      Spouse name: Elio   • Number of children: N/A   • Years of education: N/A     Occupational History   •  Self-Employed     Social History Main Topics   • Smoking status: Never Smoker   • Smokeless tobacco: Never Used   • Alcohol use No   • Drug use: No   • Sexual activity: Yes     Partners: Male     Birth control/ protection: Surgical     Other Topics Concern   • Not on file     Social History Narrative       Review of Systems   Gastrointestinal: Positive for abdominal pain.   All other systems reviewed and are negative.      Vitals:    01/24/18 1613   BP: 102/70   Temp: 98.4 °F (36.9 °C)       Physical Exam   Constitutional: She is oriented  to person, place, and time. She appears well-developed and well-nourished. No distress.   HENT:   Head: Normocephalic and atraumatic. Hair is normal.   Right Ear: Hearing, tympanic membrane, external ear and ear canal normal. No drainage. No decreased hearing is noted.   Left Ear: Hearing, tympanic membrane, external ear and ear canal normal. No decreased hearing is noted.   Nose: No nasal deformity.   Mouth/Throat: Oropharynx is clear and moist.   Eyes: Conjunctivae, EOM and lids are normal. Pupils are equal, round, and reactive to light. Right eye exhibits no discharge. Left eye exhibits no discharge.   Neck: Normal range of motion. Neck supple. No JVD present. No tracheal deviation present. No thyromegaly present.   Cardiovascular: Normal rate, regular rhythm, normal heart sounds, intact distal pulses and normal pulses.  Exam reveals no gallop and no friction rub.    No murmur heard.  Pulmonary/Chest: Effort normal and breath sounds normal. No respiratory distress. She has no wheezes. She has no rales. She exhibits no tenderness.   Abdominal: Soft. Bowel sounds are normal. She exhibits no distension and no mass. There is no tenderness. There is no rebound and no guarding. No hernia.   Musculoskeletal: Normal range of motion. She exhibits no edema, tenderness or deformity.   Lymphadenopathy:     She has no cervical adenopathy.   Neurological: She is alert and oriented to person, place, and time. She has normal reflexes. She displays normal reflexes. No cranial nerve deficit. She exhibits normal muscle tone. Coordination normal.   Skin: Skin is warm and dry. No rash noted. She is not diaphoretic. No erythema.   Psychiatric: She has a normal mood and affect. Her behavior is normal. Judgment and thought content normal.   Vitals reviewed.      Terri was seen today for abdominal pain.    Diagnoses and all orders for this visit:    Right flank discomfort  -     dicyclomine (BENTYL) 20 MG tablet; Take 1 tablet by mouth 4  (Four) Times a Day Before Meals & at Bedtime As Needed (cramping) for up to 30 days.    Assessment:  1) Right flank discomfort post cholecystectomy    Recommendations:  1) Trial of bentyl 10 mg 1-2 po QID prn abd crams.    No Follow-up on file.    Mohit Sawyer MD  1/24/2018

## 2018-01-24 NOTE — TELEPHONE ENCOUNTER
----- Message from Mohit Sawyer MD sent at 1/23/2018  4:04 PM EST -----  Tell her that her CT abd/pelvis came back looking normal. Have her f/u with me and we could discuss what other tests to do next (if anything?). lucinda

## 2018-04-25 ENCOUNTER — LAB (OUTPATIENT)
Dept: LAB | Facility: HOSPITAL | Age: 43
End: 2018-04-25

## 2018-04-25 ENCOUNTER — OFFICE VISIT (OUTPATIENT)
Dept: ONCOLOGY | Facility: CLINIC | Age: 43
End: 2018-04-25

## 2018-04-25 VITALS
RESPIRATION RATE: 16 BRPM | HEIGHT: 67 IN | WEIGHT: 149 LBS | TEMPERATURE: 98.3 F | BODY MASS INDEX: 23.39 KG/M2 | OXYGEN SATURATION: 98 % | SYSTOLIC BLOOD PRESSURE: 128 MMHG | DIASTOLIC BLOOD PRESSURE: 84 MMHG | HEART RATE: 63 BPM

## 2018-04-25 DIAGNOSIS — E06.3 HYPOTHYROIDISM DUE TO HASHIMOTO'S THYROIDITIS: ICD-10-CM

## 2018-04-25 DIAGNOSIS — E53.8 B12 DEFICIENCY: ICD-10-CM

## 2018-04-25 DIAGNOSIS — D69.3 IMMUNE THROMBOCYTOPENIA (HCC): ICD-10-CM

## 2018-04-25 DIAGNOSIS — E03.8 HYPOTHYROIDISM DUE TO HASHIMOTO'S THYROIDITIS: ICD-10-CM

## 2018-04-25 DIAGNOSIS — D50.8 OTHER IRON DEFICIENCY ANEMIA: Primary | ICD-10-CM

## 2018-04-25 DIAGNOSIS — D50.8 OTHER IRON DEFICIENCY ANEMIA: ICD-10-CM

## 2018-04-25 LAB
BASOPHILS # BLD AUTO: 0.01 10*3/MM3 (ref 0–0.1)
BASOPHILS NFR BLD AUTO: 0.1 % (ref 0–1.1)
DEPRECATED RDW RBC AUTO: 42.2 FL (ref 37–49)
EOSINOPHIL # BLD AUTO: 0 10*3/MM3 (ref 0–0.36)
EOSINOPHIL NFR BLD AUTO: 0 % (ref 1–5)
ERYTHROCYTE [DISTWIDTH] IN BLOOD BY AUTOMATED COUNT: 11.9 % (ref 11.7–14.5)
FERRITIN SERPL-MCNC: 86.5 NG/ML (ref 11–207)
HCT VFR BLD AUTO: 40.4 % (ref 34–45)
HGB BLD-MCNC: 13.3 G/DL (ref 11.5–14.9)
IMM GRANULOCYTES # BLD: 0.06 10*3/MM3 (ref 0–0.03)
IMM GRANULOCYTES NFR BLD: 0.7 % (ref 0–0.5)
IRON 24H UR-MRATE: 91 MCG/DL (ref 37–145)
IRON SATN MFR SERPL: 32 % (ref 14–48)
LYMPHOCYTES # BLD AUTO: 0.98 10*3/MM3 (ref 1–3.5)
LYMPHOCYTES NFR BLD AUTO: 12.2 % (ref 20–49)
MCH RBC QN AUTO: 31.7 PG (ref 27–33)
MCHC RBC AUTO-ENTMCNC: 32.9 G/DL (ref 32–35)
MCV RBC AUTO: 96.4 FL (ref 83–97)
MONOCYTES # BLD AUTO: 0.43 10*3/MM3 (ref 0.25–0.8)
MONOCYTES NFR BLD AUTO: 5.3 % (ref 4–12)
NEUTROPHILS # BLD AUTO: 6.57 10*3/MM3 (ref 1.5–7)
NEUTROPHILS NFR BLD AUTO: 81.7 % (ref 39–75)
NRBC BLD MANUAL-RTO: 0 /100 WBC (ref 0–0)
PLATELET # BLD AUTO: 159 10*3/MM3 (ref 150–375)
PMV BLD AUTO: 11.9 FL (ref 8.9–12.1)
RBC # BLD AUTO: 4.19 10*6/MM3 (ref 3.9–5)
T4 FREE SERPL-MCNC: 1.08 NG/DL (ref 0.93–1.7)
TIBC SERPL-MCNC: 283 MCG/DL (ref 249–505)
TRANSFERRIN SERPL-MCNC: 202 MG/DL (ref 200–360)
TSH SERPL DL<=0.05 MIU/L-ACNC: 1.63 MIU/ML (ref 0.27–4.2)
VIT B12 BLD-MCNC: 370 PG/ML (ref 211–946)
WBC NRBC COR # BLD: 8.05 10*3/MM3 (ref 4–10)

## 2018-04-25 PROCEDURE — 83540 ASSAY OF IRON: CPT

## 2018-04-25 PROCEDURE — 82728 ASSAY OF FERRITIN: CPT

## 2018-04-25 PROCEDURE — 84443 ASSAY THYROID STIM HORMONE: CPT | Performed by: INTERNAL MEDICINE

## 2018-04-25 PROCEDURE — 84466 ASSAY OF TRANSFERRIN: CPT

## 2018-04-25 PROCEDURE — 85025 COMPLETE CBC W/AUTO DIFF WBC: CPT

## 2018-04-25 PROCEDURE — 82607 VITAMIN B-12: CPT | Performed by: INTERNAL MEDICINE

## 2018-04-25 PROCEDURE — 36415 COLL VENOUS BLD VENIPUNCTURE: CPT | Performed by: INTERNAL MEDICINE

## 2018-04-25 PROCEDURE — 84439 ASSAY OF FREE THYROXINE: CPT | Performed by: INTERNAL MEDICINE

## 2018-04-25 PROCEDURE — 99213 OFFICE O/P EST LOW 20 MIN: CPT | Performed by: INTERNAL MEDICINE

## 2018-04-25 RX ORDER — METAXALONE 800 MG/1
TABLET ORAL
Refills: 0 | COMMUNITY
Start: 2018-04-22

## 2018-04-25 RX ORDER — PREDNISONE 20 MG/1
20 TABLET ORAL
COMMUNITY
Start: 2018-04-23 | End: 2018-04-27

## 2018-04-25 NOTE — PROGRESS NOTES
Subjective     CHIEF COMPLAINT:    1. Iron deficiency anemia.   2. History of Thrombocytopenia.     HISTORY OF PRESENT ILLNESS:     Terri Chaudhary is a 42 y.o.   patient with the above medical history.  She is currently on Otezla for her psoriasis.  When she started the medicine, she started having regular bowel movements occurring once a day.  She is now having constipation with bowel movements 2-3 times a week.  She continues to have the pain in the right upper abdomen with radiation to the back.  The pain improved slightly after she had the cholecystectomy but she continues to experience the pain.    The patient is eating a vegetarian diet.  She previously had B12 level close to the lower end of normal.  She continues to have fatigue.  She was found to have Hashimoto's thyroiditis and hypothyroidism.  She was prescribed thyroid replacement she did not start the Medicine yet.      Past Medical History:   Diagnosis Date   • Biliary dyskinesia    • Endometriosis    • H/O food allergy     allergic to all fruits and vegetables   • History of heavy periods    • History of seasonal allergies    • History of transfusion of packed red blood cells 2012   • History of umbilical hernia     Seen on CT scan -fat-containing-patient informed    • Iron deficiency anemia    • Psoriasis    • Psoriatic arthritis    • Thrombocytopenia    • Traumatic rupture of bladder     during hysterectomy   • Ventral hernia        Past Surgical History:   Procedure Laterality Date   • BLADDER REPAIR     •  SECTION      x4   • CHOLECYSTECTOMY WITH INTRAOPERATIVE CHOLANGIOGRAM N/A 2017    Procedure: CHOLECYSTECTOMY LAPAROSCOPIC INTRAOPERATIVE CHOLANGIOGRAM;  Surgeon: Quinton So Jr., MD;  Location: [facility];  Service:    • COLONOSCOPY  2016    NBET.  no bx    • ENDOSCOPY  2016    Erythematous mucosa in the stomach.  PATH: Gastric mucosa with patchy minimal chronic gastritis   • ENDOSCOPY N/A  "11/6/2017    patchy mild chronic inflammation   • HYSTERECTOMY  07/2014    still has ovaries   • PELVIC LAPAROSCOPY         Cancer-related family history is negative for Cancer, Breast cancer, Ovarian cancer, Uterine cancer, and Colon cancer.    SCHEDULED MEDS:       Current Outpatient Prescriptions:   •  diclofenac (VOLTAREN) 1 % gel gel, Place 4 g on the skin As Needed., Disp: , Rfl:   •  predniSONE (DELTASONE) 20 MG tablet, Take 20 mg by mouth., Disp: , Rfl:   •  Apremilast (OTEZLA) 30 MG tablet, Take 30 mg by mouth 2 (Two) Times a Day., Disp: , Rfl:   •  DiphenhydrAMINE HCl (BENADRYL ALLERGY PO), Take 2 tablets by mouth Daily As Needed., Disp: , Rfl:   •  metaxalone (SKELAXIN) 800 MG tablet, take 1 tablet by mouth four times a day if needed for pain, Disp: , Rfl: 0  •  ondansetron (ZOFRAN) 4 MG tablet, Take 4 mg by mouth Every 8 (Eight) Hours As Needed for Nausea or Vomiting., Disp: , Rfl:     REVIEW OF SYSTEMS:  GENERAL:  Weight gain.  Fatigue.   SKIN:  Skin rash from psoriasis.  HEME/LYMPH: No Anemia. No Easy bruisability.   GI:  Abdominal pain. No Nausea. No Vomiting. Constipation. No Diarrhea. No Melena. No Hematochezia.  NEUROLOGICAL:  No Dizziness.   PSYCHIATRIC:  No Anxiety. No Depression.     Objective   VITAL SIGNS:     Vitals:    04/25/18 0959   BP: 128/84   Pulse: 63   Resp: 16   Temp: 98.3 °F (36.8 °C)   TempSrc: Oral   SpO2: 98%   Weight: 67.6 kg (149 lb)   Height: 169.5 cm (66.73\")  Comment: New Ht 6mth visit   PainSc: 7  Comment: Neck pain x10 days   PainLoc: Neck       Wt Readings from Last 3 Encounters:   04/25/18 67.6 kg (149 lb)   01/24/18 65.6 kg (144 lb 9.6 oz)   01/03/18 65.5 kg (144 lb 6.4 oz)       PHYSICAL EXAMINATION:  GENERAL:  The patient appears in good general condition, not in acute distress.  SKIN:  No skin rashes or lesions. No Ecchymosis or Petechiae.  HEAD:  Normocephalic.  EYES:  No Pallor. No Jaundice.   NECK:  Supple with no Thyromegaly or Masses.  LYMPHATICS:  No cervical " or supraclavicular Lymphadenopathy.  ABDOMEN:  Soft.  Tenderness in the right upper quadrant. No Hepatomegaly. No Splenomegaly. No masses.  EXTREMITIES:  No Edema.   NEUROLOGICAL:  No Focal neurological deficits.     .  RESULT REVIEW:       Results from last 7 days  Lab Units 04/25/18  0952   WBC 10*3/mm3 8.05   NEUTROS ABS 10*3/mm3 6.57   HEMOGLOBIN g/dL 13.3   HEMATOCRIT % 40.4   PLATELETS 10*3/mm3 159       Results from last 7 days  Lab Units 04/25/18  0952   FERRITIN ng/mL 86.50   IRON mcg/dL 91   TIBC mcg/dL 283         Assessment/Plan    1.  Recurrent iron deficiency anemia.  She was previously treated with IV iron.  Hemoglobin is normal.  Iron stores are adequate.  She is a vegetarian and may develop B12 deficiency in the future specimen that she had B12 level in the lower range of normal. .    2. Persistent fatigue.  She was found to have Hypothyroidism.  Patient was recently diagnosed with Hashimoto thyroiditis.  He is gaining weight and having fatigue despite normal hemoglobin and iron studies.         PLAN:    1.  Obtain B12 level today.      2.  Obtain T4 and TSH and the results will be forwarded to her primary care physician.      3.  Repeat CBC, B-12 and iron studies in 6 months.  We will see in follow-up in one year with CBC, B-12 and iron studies at her return visit.  She will notify us sooner if she is noticing worsening of her symptoms        Bea Alvarez MD  04/25/18

## 2018-10-24 ENCOUNTER — APPOINTMENT (OUTPATIENT)
Dept: LAB | Facility: HOSPITAL | Age: 43
End: 2018-10-24

## 2018-10-29 ENCOUNTER — TELEPHONE (OUTPATIENT)
Dept: ONCOLOGY | Facility: CLINIC | Age: 43
End: 2018-10-29

## 2018-10-29 ENCOUNTER — LAB (OUTPATIENT)
Dept: LAB | Facility: HOSPITAL | Age: 43
End: 2018-10-29

## 2018-10-29 DIAGNOSIS — D50.8 OTHER IRON DEFICIENCY ANEMIA: ICD-10-CM

## 2018-10-29 LAB
BASOPHILS # BLD AUTO: 0.02 10*3/MM3 (ref 0–0.1)
BASOPHILS NFR BLD AUTO: 0.3 % (ref 0–1.1)
DEPRECATED RDW RBC AUTO: 40.1 FL (ref 37–49)
EOSINOPHIL # BLD AUTO: 0.23 10*3/MM3 (ref 0–0.36)
EOSINOPHIL NFR BLD AUTO: 3.3 % (ref 1–5)
ERYTHROCYTE [DISTWIDTH] IN BLOOD BY AUTOMATED COUNT: 11.4 % (ref 11.7–14.5)
FERRITIN SERPL-MCNC: 96.9 NG/ML (ref 11–207)
HCT VFR BLD AUTO: 42.7 % (ref 34–45)
HGB BLD-MCNC: 14.1 G/DL (ref 11.5–14.9)
IMM GRANULOCYTES # BLD: 0.02 10*3/MM3 (ref 0–0.03)
IMM GRANULOCYTES NFR BLD: 0.3 % (ref 0–0.5)
IRON 24H UR-MRATE: 118 MCG/DL (ref 37–145)
IRON SATN MFR SERPL: 37 % (ref 14–48)
LYMPHOCYTES # BLD AUTO: 1.79 10*3/MM3 (ref 1–3.5)
LYMPHOCYTES NFR BLD AUTO: 26.1 % (ref 20–49)
MCH RBC QN AUTO: 31.6 PG (ref 27–33)
MCHC RBC AUTO-ENTMCNC: 33 G/DL (ref 32–35)
MCV RBC AUTO: 95.7 FL (ref 83–97)
MONOCYTES # BLD AUTO: 0.59 10*3/MM3 (ref 0.25–0.8)
MONOCYTES NFR BLD AUTO: 8.6 % (ref 4–12)
NEUTROPHILS # BLD AUTO: 4.22 10*3/MM3 (ref 1.5–7)
NEUTROPHILS NFR BLD AUTO: 61.4 % (ref 39–75)
NRBC BLD MANUAL-RTO: 0 /100 WBC (ref 0–0)
PLATELET # BLD AUTO: 157 10*3/MM3 (ref 150–375)
PMV BLD AUTO: 12 FL (ref 8.9–12.1)
RBC # BLD AUTO: 4.46 10*6/MM3 (ref 3.9–5)
TIBC SERPL-MCNC: 319 MCG/DL (ref 249–505)
TRANSFERRIN SERPL-MCNC: 228 MG/DL (ref 200–360)
VIT B12 BLD-MCNC: 401 PG/ML (ref 211–946)
WBC NRBC COR # BLD: 6.87 10*3/MM3 (ref 4–10)

## 2018-10-29 PROCEDURE — 82607 VITAMIN B-12: CPT | Performed by: INTERNAL MEDICINE

## 2018-10-29 PROCEDURE — 36415 COLL VENOUS BLD VENIPUNCTURE: CPT | Performed by: INTERNAL MEDICINE

## 2018-10-29 PROCEDURE — 82728 ASSAY OF FERRITIN: CPT | Performed by: INTERNAL MEDICINE

## 2018-10-29 PROCEDURE — 84466 ASSAY OF TRANSFERRIN: CPT | Performed by: INTERNAL MEDICINE

## 2018-10-29 PROCEDURE — 83540 ASSAY OF IRON: CPT | Performed by: INTERNAL MEDICINE

## 2018-10-29 PROCEDURE — 85025 COMPLETE CBC W/AUTO DIFF WBC: CPT | Performed by: INTERNAL MEDICINE

## 2018-10-29 NOTE — TELEPHONE ENCOUNTER
Call informed the pt that lab results were normal(no iron and vitamin b 12 deficiency) per Dr. Alvarez. Pt v/u

## 2018-10-29 NOTE — TELEPHONE ENCOUNTER
----- Message from Bea Alvarez MD sent at 10/29/2018  3:53 PM EDT -----  Please inform the patient that her lab results were normal (no iron or vitamin B12 deficiency).    Thank you

## 2019-04-19 ENCOUNTER — OFFICE VISIT (OUTPATIENT)
Dept: GASTROENTEROLOGY | Facility: CLINIC | Age: 44
End: 2019-04-19

## 2019-04-19 VITALS
DIASTOLIC BLOOD PRESSURE: 80 MMHG | BODY MASS INDEX: 24.36 KG/M2 | WEIGHT: 155.2 LBS | TEMPERATURE: 98.5 F | SYSTOLIC BLOOD PRESSURE: 118 MMHG | HEIGHT: 67 IN

## 2019-04-19 DIAGNOSIS — K59.00 CONSTIPATION, UNSPECIFIED CONSTIPATION TYPE: ICD-10-CM

## 2019-04-19 DIAGNOSIS — R10.13 EPIGASTRIC PAIN: Primary | ICD-10-CM

## 2019-04-19 DIAGNOSIS — Z87.19 HISTORY OF ACUTE GASTRITIS: ICD-10-CM

## 2019-04-19 DIAGNOSIS — K21.9 GASTROESOPHAGEAL REFLUX DISEASE, ESOPHAGITIS PRESENCE NOT SPECIFIED: ICD-10-CM

## 2019-04-19 PROCEDURE — 99214 OFFICE O/P EST MOD 30 MIN: CPT | Performed by: NURSE PRACTITIONER

## 2019-04-19 RX ORDER — PANTOPRAZOLE SODIUM 40 MG/1
40 TABLET, DELAYED RELEASE ORAL 2 TIMES DAILY
Qty: 60 TABLET | Refills: 11 | Status: SHIPPED | OUTPATIENT
Start: 2019-04-19

## 2019-04-19 NOTE — PROGRESS NOTES
Chief Complaint   Patient presents with   • Abdominal Pain   • Constipation   • Nausea       Terri Chaudhary is a  43 y.o. female here for abd pain.     HPI  42 yo established f presents today for worsening upper abdominal pain. She is a patient of Dr. Sawyer. She was last seen in the office on 2018. She has hx GERD with acute gastritis seen on EGD last year. She admits she does not think she has typical reflux symptoms and therefore does not take any routine meds for reflux. She admits she is having worsening upper abd pain after eating. She admits the pain is better with chicken, turkey and veggies but worse with red meat and any processed foods. She does take a lot of NSAIDs routinely for her psoriatic OA.She denies any dysphagia, N&V, diarrhea, rectal bleeding or melena. She also has hx chronic constipation and admits miralax didn't work for her. She admits she does better with taking daily probiotic and digestive enzymes. She admits the pain has been so bad it reminds her of the pain she was having before she had her gallbladder removed. She admits her appetite is decreased but her weight is stable.       Past Medical History:   Diagnosis Date   • Biliary dyskinesia    • Endometriosis    • H/O food allergy     allergic to all fruits and vegetables   • Hashimoto's disease    • History of heavy periods    • History of seasonal allergies    • History of transfusion of packed red blood cells 2012   • History of umbilical hernia     Seen on CT scan -fat-containing-patient informed    • Iron deficiency anemia    • Psoriasis    • Psoriatic arthritis (CMS/HCC)    • Thrombocytopenia (CMS/HCC)    • Traumatic rupture of bladder     during hysterectomy   • Ventral hernia        Past Surgical History:   Procedure Laterality Date   • BLADDER REPAIR     •  SECTION      x4   • CHOLECYSTECTOMY WITH INTRAOPERATIVE CHOLANGIOGRAM N/A 2017    Procedure: CHOLECYSTECTOMY LAPAROSCOPIC INTRAOPERATIVE  CHOLANGIOGRAM;  Surgeon: Quinton So Jr., MD;  Location: [facility] OR;  Service:    • COLONOSCOPY  02/05/2016    NBET.  no bx    • ENDOSCOPY  02/05/2016    Erythematous mucosa in the stomach.  PATH: Gastric mucosa with patchy minimal chronic gastritis   • ENDOSCOPY N/A 11/6/2017    patchy mild chronic inflammation   • HYSTERECTOMY  07/2014    still has ovaries   • PELVIC LAPAROSCOPY         Scheduled Meds:    Continuous Infusions:  No current facility-administered medications for this visit.     PRN Meds:.    Allergies   Allergen Reactions   • Oseltamivir Nausea And Vomiting and GI Intolerance     Nausea       Social History     Socioeconomic History   • Marital status:      Spouse name: Elio   • Number of children: Not on file   • Years of education: Not on file   • Highest education level: Not on file   Occupational History     Employer: SELF-EMPLOYED   Tobacco Use   • Smoking status: Never Smoker   • Smokeless tobacco: Never Used   Substance and Sexual Activity   • Alcohol use: No   • Drug use: No   • Sexual activity: Yes     Partners: Male     Birth control/protection: Surgical       Family History   Problem Relation Age of Onset   • Aneurysm Mother         brain aneurysm   • Diabetes Father         type 2   • Deep vein thrombosis Father    • No Known Problems Sister    • No Known Problems Brother    • No Known Problems Daughter    • No Known Problems Son    • No Known Problems Maternal Aunt    • No Known Problems Maternal Uncle    • No Known Problems Paternal Aunt    • No Known Problems Paternal Uncle    • No Known Problems Maternal Grandmother    • No Known Problems Maternal Grandfather    • No Known Problems Paternal Grandmother    • No Known Problems Paternal Grandfather    • Cancer Neg Hx    • Breast cancer Neg Hx    • Ovarian cancer Neg Hx    • Uterine cancer Neg Hx    • Colon cancer Neg Hx    • Pulmonary embolism Neg Hx    • Malig Hyperthermia Neg Hx        Review of Systems    Constitutional: Positive for appetite change and fatigue. Negative for chills, diaphoresis, fever and unexpected weight change.   HENT: Negative for nosebleeds, postnasal drip, sore throat, trouble swallowing and voice change.    Respiratory: Negative for cough, choking, chest tightness, shortness of breath and wheezing.    Cardiovascular: Negative for chest pain, palpitations and leg swelling.   Gastrointestinal: Positive for abdominal distention and abdominal pain. Negative for anal bleeding, blood in stool, constipation, diarrhea, nausea, rectal pain and vomiting.   Endocrine: Negative for polydipsia, polyphagia and polyuria.   Musculoskeletal: Negative for gait problem.   Skin: Negative for rash and wound.   Allergic/Immunologic: Negative for food allergies.   Neurological: Negative for dizziness, speech difficulty and light-headedness.   Psychiatric/Behavioral: Negative for confusion, self-injury, sleep disturbance and suicidal ideas.       Vitals:    04/19/19 1120   BP: 118/80   Temp: 98.5 °F (36.9 °C)       Physical Exam   Constitutional: She is oriented to person, place, and time. She appears well-developed and well-nourished. She does not appear ill. No distress.   HENT:   Head: Normocephalic.   Eyes: Pupils are equal, round, and reactive to light.   Cardiovascular: Normal rate, regular rhythm and normal heart sounds.   Pulmonary/Chest: Effort normal and breath sounds normal.   Abdominal: Soft. Bowel sounds are normal. She exhibits no distension and no mass. There is no hepatosplenomegaly. There is tenderness. There is no rebound and no guarding. No hernia.       Musculoskeletal: Normal range of motion.   Neurological: She is alert and oriented to person, place, and time.   Skin: Skin is warm and dry.   Psychiatric: She has a normal mood and affect. Her speech is normal and behavior is normal. Judgment normal.       No images are attached to the encounter.    Assessment & Plan    1. Epigastric pain    2.  Gastroesophageal reflux disease, esophagitis presence not specified    3. History of acute gastritis    4. Constipation, unspecified constipation type    Her abd pain sounds like Gastritis/GERD flare up or possible ulcers with her NSAID use and pain after eating. Will start her on Protonix 40 mg BID for now. Gerd handout given. Follow GERD precautions. Constipation seems stable for now on current bowel regimen. Call office Monday with update. If no improvement will need to consider EGD for further evaluation. Patient agreeable to the plan.

## 2019-04-22 ENCOUNTER — TELEPHONE (OUTPATIENT)
Dept: GASTROENTEROLOGY | Facility: CLINIC | Age: 44
End: 2019-04-22

## 2019-04-22 NOTE — TELEPHONE ENCOUNTER
Regarding: Visit Follow-Up Question  Contact: [phone]  ----- Message from [employer], [employer] sent at 4/22/2019 12:29 PM EDT -----    The medication you put me on made me really sick to my stomach and severe headache

## 2019-04-22 NOTE — TELEPHONE ENCOUNTER
Called pt and pt reports that on Friday 04/19 she began protonix 40mg bid.  She states Friday evening she took her first dose and 2 1/2 hrs after taking it, she developed severe nausea and headache.  She reports she first thought is was something she ate.  Then sat am she took her morning dose before she ate and then 2 1/2 hrs late the same thing happened - severe headache and nausea.  Sunday she did not take the medication and did not have these symptoms.   Pt has not had any further doses of protonix  Advised pt she will send message to Kelli MCKEON.  Pt verb understanding

## 2019-04-22 NOTE — TELEPHONE ENCOUNTER
Yes definitely stop the protonix. Rare side effect. Would have her try some Zantac 150 mg BID instead. Thanks.

## 2019-04-23 NOTE — TELEPHONE ENCOUNTER
Call to pt.  Advise per M Pam that definitely stop the protonix.  Rare side effect.  Try some zantac 150 mg bid instead.  Verb understanding.

## 2019-06-26 ENCOUNTER — OFFICE VISIT (OUTPATIENT)
Dept: GASTROENTEROLOGY | Facility: CLINIC | Age: 44
End: 2019-06-26

## 2019-06-26 VITALS
WEIGHT: 153.4 LBS | SYSTOLIC BLOOD PRESSURE: 108 MMHG | HEIGHT: 67 IN | DIASTOLIC BLOOD PRESSURE: 74 MMHG | TEMPERATURE: 97.4 F | BODY MASS INDEX: 24.08 KG/M2

## 2019-06-26 DIAGNOSIS — R10.10 PAIN OF UPPER ABDOMEN: Primary | ICD-10-CM

## 2019-06-26 PROCEDURE — 99214 OFFICE O/P EST MOD 30 MIN: CPT | Performed by: INTERNAL MEDICINE

## 2020-02-19 NOTE — ANESTHESIA PREPROCEDURE EVALUATION
Anesthesia Evaluation     Patient summary reviewed and Nursing notes reviewed   NPO Solid Status: > 8 hours  NPO Liquid Status: > 8 hours     Airway   Mallampati: I  TM distance: >3 FB  Neck ROM: full  no difficulty expected  Dental - normal exam     Pulmonary - negative pulmonary ROS and normal exam   Cardiovascular - negative cardio ROS and normal exam        Neuro/Psych- negative ROS  GI/Hepatic/Renal/Endo - negative ROS     Musculoskeletal (-) negative ROS    Abdominal  - normal exam    Bowel sounds: normal.   Substance History - negative use     OB/GYN negative ob/gyn ROS         Other                                        Anesthesia Plan    ASA 1     MAC     Anesthetic plan and risks discussed with patient.      
None

## 2020-08-26 ENCOUNTER — LAB REQUISITION (OUTPATIENT)
Dept: LAB | Facility: HOSPITAL | Age: 45
End: 2020-08-26

## 2020-08-26 DIAGNOSIS — Z00.00 ENCOUNTER FOR GENERAL ADULT MEDICAL EXAMINATION WITHOUT ABNORMAL FINDINGS: ICD-10-CM

## 2020-08-26 PROCEDURE — 87102 FUNGUS ISOLATION CULTURE: CPT | Performed by: OTOLARYNGOLOGY

## 2020-08-26 PROCEDURE — 87070 CULTURE OTHR SPECIMN AEROBIC: CPT | Performed by: OTOLARYNGOLOGY

## 2020-08-26 PROCEDURE — 87075 CULTR BACTERIA EXCEPT BLOOD: CPT | Performed by: OTOLARYNGOLOGY

## 2020-08-26 PROCEDURE — 87147 CULTURE TYPE IMMUNOLOGIC: CPT | Performed by: OTOLARYNGOLOGY

## 2020-08-26 PROCEDURE — 87015 SPECIMEN INFECT AGNT CONCNTJ: CPT | Performed by: OTOLARYNGOLOGY

## 2020-08-26 PROCEDURE — 87205 SMEAR GRAM STAIN: CPT | Performed by: OTOLARYNGOLOGY

## 2020-08-26 PROCEDURE — 87186 SC STD MICRODIL/AGAR DIL: CPT | Performed by: OTOLARYNGOLOGY

## 2020-08-28 LAB
BACTERIA SPEC AEROBE CULT: ABNORMAL
GRAM STN SPEC: ABNORMAL
GRAM STN SPEC: ABNORMAL

## 2020-08-31 LAB — BACTERIA SPEC ANAEROBE CULT: NORMAL

## 2020-09-09 ENCOUNTER — LAB REQUISITION (OUTPATIENT)
Dept: LAB | Facility: HOSPITAL | Age: 45
End: 2020-09-09

## 2020-09-09 DIAGNOSIS — Z00.00 ENCOUNTER FOR GENERAL ADULT MEDICAL EXAMINATION WITHOUT ABNORMAL FINDINGS: ICD-10-CM

## 2020-09-09 PROCEDURE — 87205 SMEAR GRAM STAIN: CPT | Performed by: OTOLARYNGOLOGY

## 2020-09-09 PROCEDURE — 87102 FUNGUS ISOLATION CULTURE: CPT | Performed by: OTOLARYNGOLOGY

## 2020-09-09 PROCEDURE — 87070 CULTURE OTHR SPECIMN AEROBIC: CPT | Performed by: OTOLARYNGOLOGY

## 2020-09-09 PROCEDURE — 87147 CULTURE TYPE IMMUNOLOGIC: CPT | Performed by: OTOLARYNGOLOGY

## 2020-09-09 PROCEDURE — 87186 SC STD MICRODIL/AGAR DIL: CPT | Performed by: OTOLARYNGOLOGY

## 2020-09-11 LAB
BACTERIA SPEC AEROBE CULT: ABNORMAL
GRAM STN SPEC: ABNORMAL
GRAM STN SPEC: ABNORMAL

## 2020-09-23 LAB — FUNGUS WND CULT: NORMAL

## 2020-10-07 LAB — FUNGUS WND CULT: NORMAL

## 2021-04-02 ENCOUNTER — BULK ORDERING (OUTPATIENT)
Dept: CASE MANAGEMENT | Facility: OTHER | Age: 46
End: 2021-04-02

## 2021-04-02 DIAGNOSIS — Z23 IMMUNIZATION DUE: ICD-10-CM

## 2021-08-26 PROCEDURE — 87147 CULTURE TYPE IMMUNOLOGIC: CPT | Performed by: OTOLARYNGOLOGY

## 2021-08-26 PROCEDURE — 87075 CULTR BACTERIA EXCEPT BLOOD: CPT | Performed by: OTOLARYNGOLOGY

## 2021-08-26 PROCEDURE — 87205 SMEAR GRAM STAIN: CPT | Performed by: OTOLARYNGOLOGY

## 2021-08-26 PROCEDURE — 87070 CULTURE OTHR SPECIMN AEROBIC: CPT | Performed by: OTOLARYNGOLOGY

## 2021-08-26 PROCEDURE — 87015 SPECIMEN INFECT AGNT CONCNTJ: CPT | Performed by: OTOLARYNGOLOGY

## 2021-08-26 PROCEDURE — 87186 SC STD MICRODIL/AGAR DIL: CPT | Performed by: OTOLARYNGOLOGY

## 2021-08-27 ENCOUNTER — LAB REQUISITION (OUTPATIENT)
Dept: LAB | Facility: HOSPITAL | Age: 46
End: 2021-08-27

## 2021-08-27 DIAGNOSIS — Z00.00 ENCOUNTER FOR GENERAL ADULT MEDICAL EXAMINATION WITHOUT ABNORMAL FINDINGS: ICD-10-CM

## 2021-08-29 LAB
BACTERIA SPEC AEROBE CULT: ABNORMAL
GRAM STN SPEC: ABNORMAL
GRAM STN SPEC: ABNORMAL

## 2021-09-01 LAB — BACTERIA SPEC ANAEROBE CULT: NORMAL

## 2021-11-05 NOTE — TELEPHONE ENCOUNTER
----- Message from Mohit Sawyer MD sent at 12/26/2017  1:17 PM EST -----  Tell her that her labs looked mostly OK. Her urine culture did grow some bacteria but I think that it is a contaminant because there are no WBC's in the urine.We'll see what the MRI shows. lucinda  
Called pt and advised per DR Sawyer that her labs looked mostly ok.  Her urine culture did grow some bacteria but he thinks it is a contaminant because there are no wbc's in the urine.  He will see what the mri shows.     Pt verb understanding and states she gets her mri done on Saturday. She states she needs a f/u appt with Dr Sawyer. Advised would send message to manager for appt and it will be next week when we get back with her .  Pt verb understanding.  
Immediate family member

## 2022-03-30 ENCOUNTER — LAB REQUISITION (OUTPATIENT)
Dept: LAB | Facility: HOSPITAL | Age: 47
End: 2022-03-30

## 2022-03-30 DIAGNOSIS — Z00.00 ENCOUNTER FOR GENERAL ADULT MEDICAL EXAMINATION WITHOUT ABNORMAL FINDINGS: ICD-10-CM

## 2022-03-30 LAB — SARS-COV-2 ORF1AB RESP QL NAA+PROBE: NOT DETECTED

## 2022-03-30 PROCEDURE — U0004 COV-19 TEST NON-CDC HGH THRU: HCPCS | Performed by: OTOLARYNGOLOGY

## 2022-04-04 ENCOUNTER — LAB REQUISITION (OUTPATIENT)
Dept: LAB | Facility: HOSPITAL | Age: 47
End: 2022-04-04

## 2022-04-04 DIAGNOSIS — Z00.00 ENCOUNTER FOR GENERAL ADULT MEDICAL EXAMINATION WITHOUT ABNORMAL FINDINGS: ICD-10-CM

## 2022-04-04 PROCEDURE — 88304 TISSUE EXAM BY PATHOLOGIST: CPT | Performed by: OTOLARYNGOLOGY

## 2022-04-05 LAB
LAB AP CASE REPORT: NORMAL
LAB AP CLINICAL INFORMATION: NORMAL
PATH REPORT.FINAL DX SPEC: NORMAL
PATH REPORT.GROSS SPEC: NORMAL

## 2022-06-16 ENCOUNTER — LAB REQUISITION (OUTPATIENT)
Dept: LAB | Facility: HOSPITAL | Age: 47
End: 2022-06-16

## 2022-06-16 DIAGNOSIS — Z00.00 ENCOUNTER FOR GENERAL ADULT MEDICAL EXAMINATION WITHOUT ABNORMAL FINDINGS: ICD-10-CM

## 2022-06-16 PROCEDURE — 87070 CULTURE OTHR SPECIMN AEROBIC: CPT | Performed by: OTOLARYNGOLOGY

## 2022-06-16 PROCEDURE — 87205 SMEAR GRAM STAIN: CPT | Performed by: OTOLARYNGOLOGY

## 2022-06-16 PROCEDURE — 87147 CULTURE TYPE IMMUNOLOGIC: CPT | Performed by: OTOLARYNGOLOGY

## 2022-06-16 PROCEDURE — 87075 CULTR BACTERIA EXCEPT BLOOD: CPT | Performed by: OTOLARYNGOLOGY

## 2022-06-16 PROCEDURE — 87076 CULTURE ANAEROBE IDENT EACH: CPT | Performed by: OTOLARYNGOLOGY

## 2022-06-16 PROCEDURE — 87186 SC STD MICRODIL/AGAR DIL: CPT | Performed by: OTOLARYNGOLOGY

## 2022-06-16 PROCEDURE — 87102 FUNGUS ISOLATION CULTURE: CPT | Performed by: OTOLARYNGOLOGY

## 2022-06-19 LAB
BACTERIA SPEC AEROBE CULT: ABNORMAL
BACTERIA SPEC AEROBE CULT: ABNORMAL
GRAM STN SPEC: ABNORMAL
GRAM STN SPEC: ABNORMAL

## 2022-06-21 LAB — BACTERIA SPEC ANAEROBE CULT: NORMAL

## 2022-07-14 LAB — FUNGUS WND CULT: NORMAL

## 2023-07-25 ENCOUNTER — PROCEDURE VISIT (OUTPATIENT)
Dept: OBSTETRICS AND GYNECOLOGY | Facility: CLINIC | Age: 48
End: 2023-07-25
Payer: COMMERCIAL

## 2023-07-25 ENCOUNTER — OFFICE VISIT (OUTPATIENT)
Dept: OBSTETRICS AND GYNECOLOGY | Facility: CLINIC | Age: 48
End: 2023-07-25
Payer: COMMERCIAL

## 2023-07-25 VITALS
WEIGHT: 154 LBS | HEART RATE: 86 BPM | BODY MASS INDEX: 24.17 KG/M2 | HEIGHT: 67 IN | DIASTOLIC BLOOD PRESSURE: 90 MMHG | SYSTOLIC BLOOD PRESSURE: 166 MMHG

## 2023-07-25 DIAGNOSIS — N94.10 FEMALE DYSPAREUNIA: ICD-10-CM

## 2023-07-25 DIAGNOSIS — Z12.31 VISIT FOR SCREENING MAMMOGRAM: Primary | ICD-10-CM

## 2023-07-25 DIAGNOSIS — Z01.419 ENCOUNTER FOR GYNECOLOGICAL EXAMINATION WITHOUT ABNORMAL FINDING: Primary | ICD-10-CM

## 2023-07-25 PROCEDURE — 77063 BREAST TOMOSYNTHESIS BI: CPT | Performed by: OBSTETRICS & GYNECOLOGY

## 2023-07-25 PROCEDURE — 77067 SCR MAMMO BI INCL CAD: CPT | Performed by: OBSTETRICS & GYNECOLOGY

## 2023-07-25 RX ORDER — ASCORBIC ACID 250 MG
250 TABLET ORAL DAILY
COMMUNITY

## 2023-07-25 RX ORDER — ESTRADIOL 0.1 MG/G
0.5 CREAM VAGINAL
Qty: 42.5 G | Refills: 12 | Status: SHIPPED | OUTPATIENT
Start: 2023-07-25

## 2023-07-25 RX ORDER — SECUKINUMAB 150 MG/ML
INJECTION SUBCUTANEOUS
COMMUNITY
Start: 2023-07-06

## 2023-07-25 NOTE — PROGRESS NOTES
GYN Annual Exam     CC- Here for annual exam.     Glory Chaudhary is a 47 y.o. female who presents for annual well woman exam. Periods are  absent due to Hysterectomy.     OB History          4    Para   4    Term                AB        Living   4         SAB        IAB        Ectopic        Molar        Multiple        Live Births                    Current contraception: status post hysterectomy  History of abnormal Pap smear: no  Family history of uterine, colon or ovarian cancer: no  History of abnormal mammogram: no  Family history of breast cancer: no  Last Pap : record not on file  Last Mammo: today  Last Colonoscopy:     Past Medical History:   Diagnosis Date    Biliary dyskinesia     Endometriosis     H/O food allergy     allergic to all fruits and vegetables    Hashimoto's disease     History of heavy periods     History of seasonal allergies     History of transfusion of packed red blood cells 2012    History of umbilical hernia     Seen on CT scan -fat-containing-patient informed     Iron deficiency anemia     Psoriasis     Psoriatic arthritis     Thrombocytopenia     Traumatic rupture of bladder     during hysterectomy    Ventral hernia        Past Surgical History:   Procedure Laterality Date    BLADDER REPAIR       SECTION      x4    CHOLECYSTECTOMY WITH INTRAOPERATIVE CHOLANGIOGRAM N/A 2017    Procedure: CHOLECYSTECTOMY LAPAROSCOPIC INTRAOPERATIVE CHOLANGIOGRAM;  Surgeon: Quinton So Jr., MD;  Location: [facility];  Service:     COLONOSCOPY  2016    NBET.  no bx     ENDOSCOPY  2016    Erythematous mucosa in the stomach.  PATH: Gastric mucosa with patchy minimal chronic gastritis    ENDOSCOPY N/A 2017    patchy mild chronic inflammation    HYSTERECTOMY  2014    still has ovaries    PELVIC LAPAROSCOPY      TONSILLECTOMY           Current Outpatient Medications:     Cosentyx Sensoready, 300 MG, 150 MG/ML solution  auto-injector, , Disp: , Rfl:     ascorbic acid (VITAMIN C) 250 MG tablet, Take 1 tablet by mouth Daily., Disp: , Rfl:     DiphenhydrAMINE HCl (BENADRYL ALLERGY PO), Take 2 tablets by mouth Daily As Needed., Disp: , Rfl:     ELDERBERRY PO, Take  by mouth Daily., Disp: , Rfl:     estradiol (ESTRACE VAGINAL) 0.1 MG/GM vaginal cream, Insert 0.5 g into the vagina 3 (Three) Times a Week if Needed (atrophy)., Disp: 42.5 g, Rfl: 12    Lactobacillus (PROBIOTIC ACIDOPHILUS PO), Take  by mouth Daily., Disp: , Rfl:     Multiple Vitamins-Calcium (ONE-A-DAY WOMENS PO), Take  by mouth Daily., Disp: , Rfl:     Multiple Vitamins-Minerals (ZINC PO), Take  by mouth Daily., Disp: , Rfl:     Allergies   Allergen Reactions    Adalimumab Other (See Comments)     Leg pain from 9-19    Oseltamivir Nausea And Vomiting and GI Intolerance     Nausea    Pantoprazole Nausea Only and Other (See Comments)     Headaches       Social History     Tobacco Use    Smoking status: Never    Smokeless tobacco: Never   Substance Use Topics    Alcohol use: No    Drug use: No       Family History   Problem Relation Age of Onset    Aneurysm Mother         brain aneurysm    Diabetes Father         type 2    Deep vein thrombosis Father     No Known Problems Sister     No Known Problems Brother     No Known Problems Daughter     No Known Problems Son     No Known Problems Maternal Aunt     No Known Problems Maternal Uncle     No Known Problems Paternal Aunt     No Known Problems Paternal Uncle     No Known Problems Maternal Grandmother     No Known Problems Maternal Grandfather     No Known Problems Paternal Grandmother     No Known Problems Paternal Grandfather     Cancer Neg Hx     Breast cancer Neg Hx     Ovarian cancer Neg Hx     Uterine cancer Neg Hx     Colon cancer Neg Hx     Pulmonary embolism Neg Hx     Malig Hyperthermia Neg Hx        Review of Systems   Constitutional:  Negative for chills and fever.   Gastrointestinal:  Negative for abdominal pain,  "constipation and diarrhea.   Genitourinary:  Negative for pelvic pain, vaginal bleeding and vaginal discharge.   All other systems reviewed and are negative.    /90   Pulse 86   Ht 170.2 cm (67\")   Wt 69.9 kg (154 lb)   BMI 24.12 kg/m²     Physical Exam  Constitutional:       General: She is not in acute distress.     Appearance: She is well-developed and normal weight.   Genitourinary:      Vulva normal.      Right Labia: No lesions or Bartholin's cyst.     Left Labia: No lesions or Bartholin's cyst.     No inguinal adenopathy present in the right or left side.     Vaginal cuff intact.     No vaginal discharge, bleeding or cuff induration.        Right Adnexa: not tender, not full and no mass present.     Left Adnexa: not tender, not full and no mass present.     Cervix is absent.      Uterus is absent.   Breasts:     Right: No inverted nipple, mass or nipple discharge.      Left: No inverted nipple, mass or nipple discharge.   HENT:      Head: Normocephalic and atraumatic.      Nose: Nose normal.   Eyes:      Conjunctiva/sclera: Conjunctivae normal.      Pupils: Pupils are equal, round, and reactive to light.   Neck:      Thyroid: No thyromegaly.   Cardiovascular:      Rate and Rhythm: Normal rate and regular rhythm.      Heart sounds: Normal heart sounds. No murmur heard.  Pulmonary:      Effort: Pulmonary effort is normal. No respiratory distress.      Breath sounds: Normal breath sounds.   Abdominal:      General: Abdomen is flat. There is no distension.      Palpations: Abdomen is soft.      Tenderness: There is no abdominal tenderness.   Musculoskeletal:         General: No deformity. Normal range of motion.      Cervical back: Normal range of motion and neck supple.      Right lower leg: No edema.      Left lower leg: No edema.   Lymphadenopathy:      Lower Body: No right inguinal adenopathy. No left inguinal adenopathy.   Neurological:      Mental Status: She is alert and oriented to person, " place, and time.   Skin:     General: Skin is warm and dry.      Findings: No erythema.   Psychiatric:         Behavior: Behavior normal.         Thought Content: Thought content normal.         Judgment: Judgment normal.   Vitals reviewed. Exam conducted with a chaperone present.             Assessment     Diagnoses and all orders for this visit:    1. Encounter for gynecological examination without abnormal finding (Primary)  -     IGP, Apt HPV,rfx 16 / 18,45    2. Female dyspareunia    Other orders  -     estradiol (ESTRACE VAGINAL) 0.1 MG/GM vaginal cream; Insert 0.5 g into the vagina 3 (Three) Times a Week if Needed (atrophy).  Dispense: 42.5 g; Refill: 12    1) GYN Exam  Expectations reviewed.   Encouraged recheck BP in 3-6 months     2) Dyspareunia   Suspect vaginal atrophy   Does have hx of endometriosis.     - Trial of estrace   - other consideration would be hormonal suppression or possible GnRH agent to try and treat any endometriosis for 6-24 months. Going to try cream first and consider from there      Plan     1) Breast Health - Clinical breast exam yearly, Discussed American cancer society recommendations for breast cancer screening, and Self breast awareness monthly  MMG updated, CBE normal   2) Pap - updated today   3) Smoking status- Non-smoker   4) Encouraged between 7-8 hours of good sleep per night.   5) Follow up prn and one year.       Maurice Florez MD   7/25/2023  10:56 EDT

## 2023-07-28 LAB
CYTOLOGIST CVX/VAG CYTO: NORMAL
CYTOLOGY CVX/VAG DOC CYTO: NORMAL
CYTOLOGY CVX/VAG DOC THIN PREP: NORMAL
DX ICD CODE: NORMAL
HIV 1 & 2 AB SER-IMP: NORMAL
HPV I/H RISK 4 DNA CVX QL PROBE+SIG AMP: NEGATIVE
OTHER STN SPEC: NORMAL
STAT OF ADQ CVX/VAG CYTO-IMP: NORMAL

## 2024-08-29 ENCOUNTER — LAB REQUISITION (OUTPATIENT)
Dept: LAB | Facility: HOSPITAL | Age: 49
End: 2024-08-29
Payer: COMMERCIAL

## 2024-08-29 DIAGNOSIS — H60.393 OTHER INFECTIVE OTITIS EXTERNA, BILATERAL: ICD-10-CM

## 2024-08-29 PROCEDURE — 87077 CULTURE AEROBIC IDENTIFY: CPT | Performed by: OTOLARYNGOLOGY

## 2024-08-29 PROCEDURE — 87075 CULTR BACTERIA EXCEPT BLOOD: CPT | Performed by: OTOLARYNGOLOGY

## 2024-08-29 PROCEDURE — 87070 CULTURE OTHR SPECIMN AEROBIC: CPT | Performed by: OTOLARYNGOLOGY

## 2024-08-29 PROCEDURE — 87186 SC STD MICRODIL/AGAR DIL: CPT | Performed by: OTOLARYNGOLOGY

## 2024-08-29 PROCEDURE — 87015 SPECIMEN INFECT AGNT CONCNTJ: CPT | Performed by: OTOLARYNGOLOGY

## 2024-08-29 PROCEDURE — 87205 SMEAR GRAM STAIN: CPT | Performed by: OTOLARYNGOLOGY

## 2024-08-29 PROCEDURE — 87102 FUNGUS ISOLATION CULTURE: CPT | Performed by: OTOLARYNGOLOGY

## 2024-09-01 LAB
BACTERIA SPEC AEROBE CULT: ABNORMAL
BACTERIA SPEC ANAEROBE CULT: NORMAL
GRAM STN SPEC: ABNORMAL
GRAM STN SPEC: ABNORMAL

## 2024-09-03 LAB — BACTERIA SPEC ANAEROBE CULT: NORMAL

## 2024-09-05 LAB — FUNGUS WND CULT: NORMAL

## 2024-09-12 LAB — FUNGUS WND CULT: NORMAL

## 2024-09-19 LAB — FUNGUS WND CULT: NORMAL

## 2024-09-27 LAB — FUNGUS WND CULT: ABNORMAL

## 2025-08-22 ENCOUNTER — LAB REQUISITION (OUTPATIENT)
Dept: LAB | Facility: HOSPITAL | Age: 50
End: 2025-08-22

## 2025-08-22 DIAGNOSIS — H92.11 OTORRHEA, RIGHT EAR: ICD-10-CM

## 2025-08-22 PROCEDURE — 87106 FUNGI IDENTIFICATION YEAST: CPT | Performed by: OTOLARYNGOLOGY

## 2025-08-22 PROCEDURE — 87205 SMEAR GRAM STAIN: CPT | Performed by: OTOLARYNGOLOGY

## 2025-08-22 PROCEDURE — 87147 CULTURE TYPE IMMUNOLOGIC: CPT | Performed by: OTOLARYNGOLOGY

## 2025-08-22 PROCEDURE — 87070 CULTURE OTHR SPECIMN AEROBIC: CPT | Performed by: OTOLARYNGOLOGY

## 2025-08-22 PROCEDURE — 87102 FUNGUS ISOLATION CULTURE: CPT | Performed by: OTOLARYNGOLOGY

## 2025-08-22 PROCEDURE — 87186 SC STD MICRODIL/AGAR DIL: CPT | Performed by: OTOLARYNGOLOGY

## 2025-08-22 PROCEDURE — 87077 CULTURE AEROBIC IDENTIFY: CPT | Performed by: OTOLARYNGOLOGY

## 2025-08-30 LAB
BACTERIA SPEC AEROBE CULT: ABNORMAL
GRAM STN SPEC: ABNORMAL
GRAM STN SPEC: ABNORMAL

## (undated) DEVICE — ENDOPATH XCEL BLUNT TIP TROCARS WITH SMOOTH SLEEVES: Brand: ENDOPATH XCEL

## (undated) DEVICE — CATH IV INSYTE AUTOGARD 14G 1 1/2IN ORNG

## (undated) DEVICE — SUT MNCRYL PLS ANTIB UD 4/0 PS2 18IN

## (undated) DEVICE — ENDOPOUCH RETRIEVER SPECIMEN RETRIEVAL BAGS: Brand: ENDOPOUCH RETRIEVER

## (undated) DEVICE — APPL CHLORAPREP W/TINT 26ML ORNG

## (undated) DEVICE — DRAPE,REIN 53X77,STERILE: Brand: MEDLINE

## (undated) DEVICE — PENCL E/S HNDSWCH ROCKR CB

## (undated) DEVICE — SUT VIC 0 TN 27IN DYED JTN0G

## (undated) DEVICE — ENDOPATH XCEL UNIVERSAL TROCAR STABLILITY SLEEVES: Brand: ENDOPATH XCEL

## (undated) DEVICE — Device: Brand: DEFENDO AIR/WATER/SUCTION AND BIOPSY VALVE

## (undated) DEVICE — ENDOCUT SCISSOR TIP, DISPOSABLE: Brand: RENEW

## (undated) DEVICE — ENCORE® LATEX ORTHO SIZE 7.5, STERILE LATEX POWDER-FREE SURGICAL GLOVE: Brand: ENCORE

## (undated) DEVICE — 3M™ STERI-STRIP™ COMPOUND BENZOIN TINCTURE 40 BAGS/CARTON 4 CARTONS/CASE C1544: Brand: 3M™ STERI-STRIP™

## (undated) DEVICE — CANN NASL CO2 TRULINK W/O2 A/

## (undated) DEVICE — ENDOPATH XCEL BLADELESS TROCARS WITH STABILITY SLEEVES: Brand: ENDOPATH XCEL

## (undated) DEVICE — TUBING, SUCTION, 1/4" X 10', STRAIGHT: Brand: MEDLINE

## (undated) DEVICE — SOL NACL 0.9PCT 1000ML

## (undated) DEVICE — EXTENSION SET, MALE LUER LOCK ADAPTER WITH RETRACTABLE COLLAR

## (undated) DEVICE — FRCP BX RADJAW4 NDL 2.8 240CM LG OG BX40

## (undated) DEVICE — LOU LAP CHOLE: Brand: MEDLINE INDUSTRIES, INC.

## (undated) DEVICE — BITEBLOCK OMNI BLOC

## (undated) DEVICE — CONTAINER,SPECIMEN,OR STERILE,4OZ: Brand: MEDLINE

## (undated) DEVICE — ENDOPATH PNEUMONEEDLE INSUFFLATION NEEDLES WITH LUER LOCK CONNECTORS 120MM: Brand: ENDOPATH

## (undated) DEVICE — DRP C/ARM 41X74IN

## (undated) DEVICE — STPCK 3WY D201 DISCOFIX

## (undated) DEVICE — CATH CHOLANG 4.5F18IN BRGNDY

## (undated) DEVICE — TBG 02 CRUSH RESIST LF CLR 7FT